# Patient Record
Sex: MALE | Race: OTHER | NOT HISPANIC OR LATINO | URBAN - METROPOLITAN AREA
[De-identification: names, ages, dates, MRNs, and addresses within clinical notes are randomized per-mention and may not be internally consistent; named-entity substitution may affect disease eponyms.]

---

## 2018-09-02 ENCOUNTER — EMERGENCY (EMERGENCY)
Facility: HOSPITAL | Age: 48
LOS: 1 days | Discharge: HOME | End: 2018-09-02
Attending: EMERGENCY MEDICINE | Admitting: EMERGENCY MEDICINE
Payer: COMMERCIAL

## 2018-09-02 VITALS
RESPIRATION RATE: 16 BRPM | DIASTOLIC BLOOD PRESSURE: 105 MMHG | SYSTOLIC BLOOD PRESSURE: 165 MMHG | OXYGEN SATURATION: 100 % | HEART RATE: 80 BPM | TEMPERATURE: 98 F

## 2018-09-02 LAB
ALBUMIN SERPL ELPH-MCNC: 4.3 G/DL — SIGNIFICANT CHANGE UP (ref 3.3–5)
ALP SERPL-CCNC: 65 U/L — SIGNIFICANT CHANGE UP (ref 40–120)
ALT FLD-CCNC: 18 U/L — SIGNIFICANT CHANGE UP (ref 4–41)
AST SERPL-CCNC: 19 U/L — SIGNIFICANT CHANGE UP (ref 4–40)
BASOPHILS # BLD AUTO: 0.07 K/UL — SIGNIFICANT CHANGE UP (ref 0–0.2)
BASOPHILS NFR BLD AUTO: 1.1 % — SIGNIFICANT CHANGE UP (ref 0–2)
BILIRUB SERPL-MCNC: 0.4 MG/DL — SIGNIFICANT CHANGE UP (ref 0.2–1.2)
BUN SERPL-MCNC: 15 MG/DL — SIGNIFICANT CHANGE UP (ref 7–23)
CALCIUM SERPL-MCNC: 8.9 MG/DL — SIGNIFICANT CHANGE UP (ref 8.4–10.5)
CHLORIDE SERPL-SCNC: 99 MMOL/L — SIGNIFICANT CHANGE UP (ref 98–107)
CO2 SERPL-SCNC: 22 MMOL/L — SIGNIFICANT CHANGE UP (ref 22–31)
CREAT SERPL-MCNC: 0.82 MG/DL — SIGNIFICANT CHANGE UP (ref 0.5–1.3)
EOSINOPHIL # BLD AUTO: 0.38 K/UL — SIGNIFICANT CHANGE UP (ref 0–0.5)
EOSINOPHIL NFR BLD AUTO: 5.7 % — SIGNIFICANT CHANGE UP (ref 0–6)
GLUCOSE SERPL-MCNC: 89 MG/DL — SIGNIFICANT CHANGE UP (ref 70–99)
HBA1C BLD-MCNC: 5.3 % — SIGNIFICANT CHANGE UP (ref 4–5.6)
HCT VFR BLD CALC: 44.7 % — SIGNIFICANT CHANGE UP (ref 39–50)
HGB BLD-MCNC: 14.9 G/DL — SIGNIFICANT CHANGE UP (ref 13–17)
IMM GRANULOCYTES # BLD AUTO: 0.02 # — SIGNIFICANT CHANGE UP
IMM GRANULOCYTES NFR BLD AUTO: 0.3 % — SIGNIFICANT CHANGE UP (ref 0–1.5)
LIDOCAIN IGE QN: 45.4 U/L — SIGNIFICANT CHANGE UP (ref 7–60)
LYMPHOCYTES # BLD AUTO: 2.2 K/UL — SIGNIFICANT CHANGE UP (ref 1–3.3)
LYMPHOCYTES # BLD AUTO: 33.1 % — SIGNIFICANT CHANGE UP (ref 13–44)
MCHC RBC-ENTMCNC: 30.2 PG — SIGNIFICANT CHANGE UP (ref 27–34)
MCHC RBC-ENTMCNC: 33.3 % — SIGNIFICANT CHANGE UP (ref 32–36)
MCV RBC AUTO: 90.5 FL — SIGNIFICANT CHANGE UP (ref 80–100)
MONOCYTES # BLD AUTO: 0.63 K/UL — SIGNIFICANT CHANGE UP (ref 0–0.9)
MONOCYTES NFR BLD AUTO: 9.5 % — SIGNIFICANT CHANGE UP (ref 2–14)
NEUTROPHILS # BLD AUTO: 3.35 K/UL — SIGNIFICANT CHANGE UP (ref 1.8–7.4)
NEUTROPHILS NFR BLD AUTO: 50.3 % — SIGNIFICANT CHANGE UP (ref 43–77)
NRBC # FLD: 0 — SIGNIFICANT CHANGE UP
PLATELET # BLD AUTO: 206 K/UL — SIGNIFICANT CHANGE UP (ref 150–400)
PMV BLD: 9.5 FL — SIGNIFICANT CHANGE UP (ref 7–13)
POTASSIUM SERPL-MCNC: 3.8 MMOL/L — SIGNIFICANT CHANGE UP (ref 3.5–5.3)
POTASSIUM SERPL-SCNC: 3.8 MMOL/L — SIGNIFICANT CHANGE UP (ref 3.5–5.3)
PROT SERPL-MCNC: 7.3 G/DL — SIGNIFICANT CHANGE UP (ref 6–8.3)
RBC # BLD: 4.94 M/UL — SIGNIFICANT CHANGE UP (ref 4.2–5.8)
RBC # FLD: 13.1 % — SIGNIFICANT CHANGE UP (ref 10.3–14.5)
SODIUM SERPL-SCNC: 137 MMOL/L — SIGNIFICANT CHANGE UP (ref 135–145)
TROPONIN T, HIGH SENSITIVITY: 7 NG/L — SIGNIFICANT CHANGE UP (ref ?–14)
TROPONIN T, HIGH SENSITIVITY: < 6 NG/L — SIGNIFICANT CHANGE UP (ref ?–14)
WBC # BLD: 6.65 K/UL — SIGNIFICANT CHANGE UP (ref 3.8–10.5)
WBC # FLD AUTO: 6.65 K/UL — SIGNIFICANT CHANGE UP (ref 3.8–10.5)

## 2018-09-02 PROCEDURE — 99220: CPT | Mod: 25

## 2018-09-02 PROCEDURE — 93010 ELECTROCARDIOGRAM REPORT: CPT | Mod: 59

## 2018-09-02 PROCEDURE — 71045 X-RAY EXAM CHEST 1 VIEW: CPT | Mod: 26

## 2018-09-02 RX ORDER — AMLODIPINE BESYLATE 2.5 MG/1
10 TABLET ORAL DAILY
Qty: 0 | Refills: 0 | Status: DISCONTINUED | OUTPATIENT
Start: 2018-09-02 | End: 2018-09-06

## 2018-09-02 RX ORDER — ASPIRIN/CALCIUM CARB/MAGNESIUM 324 MG
162 TABLET ORAL DAILY
Qty: 0 | Refills: 0 | Status: DISCONTINUED | OUTPATIENT
Start: 2018-09-02 | End: 2018-09-02

## 2018-09-02 RX ORDER — ASPIRIN/CALCIUM CARB/MAGNESIUM 324 MG
81 TABLET ORAL DAILY
Qty: 0 | Refills: 0 | Status: DISCONTINUED | OUTPATIENT
Start: 2018-09-03 | End: 2018-09-06

## 2018-09-02 RX ADMIN — AMLODIPINE BESYLATE 10 MILLIGRAM(S): 2.5 TABLET ORAL at 19:57

## 2018-09-02 RX ADMIN — Medication 162 MILLIGRAM(S): at 13:20

## 2018-09-02 RX ADMIN — Medication 100 MILLIGRAM(S): at 15:14

## 2018-09-02 NOTE — ED ADULT NURSE NOTE - CHIEF COMPLAINT QUOTE
Pt c/o chest tightness, dizziness and sob  since this am. L arm  and L sided pain/numbness.  Denies nausea, .  pt sent by [facility] center

## 2018-09-02 NOTE — ED PROVIDER NOTE - OBJECTIVE STATEMENT
49 yo M hx of HTN here for chest pain x a few days. pt reports over the past few days noted intermittent L sided chest discomfort, occassional SOB, last night noted discomfort became persistent to went to urgent care today and was sent to ED to r/o ACS. States pain is L sided, non radiating, nothing makes better/worse. Denies fhx of heart dz. Has not had stress or cards f/u. Denies fever chills vomiting HA dizziness weakness numbness tingling. 47 yo M hx of HTN here for chest pain x a few days. pt reports over the past few days noted intermittent L sided chest discomfort, occassional SOB, last night noted discomfort became persistent to went to urgent care today and was sent to ED to r/o ACS. States pain is L sided, non radiating, nothing makes better/worse. Denies fhx of heart dz. Has not had stress or cards f/u. Denies fever chills vomiting HA dizziness weakness numbness tingling.  Of note pt mentions drinking several days per week however denies withdrawral sxs or seizures in past. last drink was yesterday.

## 2018-09-02 NOTE — ED ADULT TRIAGE NOTE - CHIEF COMPLAINT QUOTE
Pt c/o chest tightness, dizziness and sob  since this am. L arm  and L sided pain/numbness.  Denies nausea, .  pt sent by [facility] center

## 2018-09-02 NOTE — ED CDU PROVIDER INITIAL DAY NOTE - ATTENDING CONTRIBUTION TO CARE
CDU MD DOUGHERTY:  I performed a face to face bedside interview with patient regarding history of present illness, review of symptoms and past medical history. I completed an independent physical exam.  I have discussed patient's plan of care with PA.   I agree with note as stated above, having amended the EMR as needed to reflect my findings. I have discussed the assessment and plan of care.  This includes during the time I functioned as the attending physician for this patient.    49 y/o male etoh abuse, htn with cp.  EKG tw abn v5, v6, no dynamic changes in ED.  Initial trop 7.  CDU for rpt trop and ekg, tele monitoring, stress in am.

## 2018-09-02 NOTE — ED PROVIDER NOTE - ATTENDING CONTRIBUTION TO CARE
I agree with the above H&P.  Briefly this is a 48 year old male presents with left sided cp that has been intermittent for a few weeks but today also developed left arm pain as well.  concern for acs vs msk pain vs pna vs ptx.  patient without prior cardiac testing. will check labs trop, cxr.  if all wnl will send to obs for stress test

## 2018-09-02 NOTE — ED ADULT NURSE NOTE - NSIMPLEMENTINTERV_GEN_ALL_ED
Implemented All Universal Safety Interventions:  [place] to call system. Call bell, personal items and telephone within reach. Instruct patient to call for assistance. Room bathroom lighting operational. Non-slip footwear when patient is off stretcher. Physically safe environment: no spills, clutter or unnecessary equipment. Stretcher in lowest position, wheels locked, appropriate side rails in place.

## 2018-09-02 NOTE — ED ADULT NURSE NOTE - OBJECTIVE STATEMENT
patient alert ox3 came in c/o left side pain radiating to left arm . h/o HTN, alcohol abuse. c/o on and off nausea. denies vomiting, connected to CM shows NSR. labs done as ordered. awaiting results and re eval.

## 2018-09-02 NOTE — ED CDU PROVIDER INITIAL DAY NOTE - OBJECTIVE STATEMENT
49 yo M hx of HTN here for chest pain x a few days. pt reports over the past few days noted intermittent L sided chest discomfort, occassional SOB, last night noted discomfort became persistent to went to urgent care today and was sent to ED to r/o ACS. States pain is L sided, non radiating, nothing makes better/worse. Denies fhx of heart dz. Has not had stress or cards f/u. Denies fever chills vomiting HA dizziness weakness numbness tingling.  Of note pt mentions drinking several days per week however denies withdrawral sxs or seizures in past. last drink was yesterday.    CDU ELIZABETH Cobos: Agree with above. Pt is a 49 y/o M hx HTN here w/ left sided chest pain w/ radiation into the left shoulder/arm x 2 days, occurs at rest, associated w/ shortness of breath. Never had a stress test in the past. Non smoker, no fam hx CAD. +daily drinker, drinks 2 shots a day. Sent to CDU for CEx2, telemetry, stress in the am. No complaints at present.

## 2018-09-03 VITALS
TEMPERATURE: 98 F | OXYGEN SATURATION: 99 % | DIASTOLIC BLOOD PRESSURE: 82 MMHG | RESPIRATION RATE: 17 BRPM | SYSTOLIC BLOOD PRESSURE: 134 MMHG | HEART RATE: 93 BPM

## 2018-09-03 PROCEDURE — 93018 CV STRESS TEST I&R ONLY: CPT | Mod: GC

## 2018-09-03 PROCEDURE — 78452 HT MUSCLE IMAGE SPECT MULT: CPT | Mod: 26

## 2018-09-03 PROCEDURE — 93016 CV STRESS TEST SUPVJ ONLY: CPT | Mod: GC

## 2018-09-03 PROCEDURE — 99217: CPT

## 2018-09-03 RX ADMIN — Medication 81 MILLIGRAM(S): at 12:48

## 2018-09-03 RX ADMIN — AMLODIPINE BESYLATE 10 MILLIGRAM(S): 2.5 TABLET ORAL at 06:14

## 2018-09-03 NOTE — ED CDU PROVIDER SUBSEQUENT DAY NOTE - MEDICAL DECISION MAKING DETAILS
47 yo M hx of HTN here for chest pain x a few days. pt reports over the past few days noted intermittent L sided chest discomfort, occasional SOB. Patient has been asymptomatic while in CDU, plan is an AM stress test, no other acute symptoms or changes.

## 2018-09-03 NOTE — ED CDU PROVIDER SUBSEQUENT DAY NOTE - HISTORY
49 yo M hx of HTN here for chest pain x a few days. pt reports over the past few days noted intermittent L sided chest discomfort, occassional SOB, last night noted discomfort became persistent to went to urgent care today and was sent to ED to r/o ACS. States pain is L sided, non radiating, nothing makes better/worse. Denies fhx of heart dz. Has not had stress or cards f/u. Denies fever chills vomiting HA dizziness weakness numbness tingling.  Of note pt mentions drinking several days per week however denies withdrawral sxs or seizures in past. last drink was yesterday.  CDU ELIZABETH Diaz: Agree with above, 49 yo M hx of HTN here for chest pain x a few days. pt reports over the past few days noted intermittent L sided chest discomfort, occasional SOB, last night noted discomfort became persistent to went to urgent care today and was sent to ED to r/o ACS. Patient drinks 2 shots daily, was given librium by attending Cho. Patient to CDU for a stress test to evaluate CP, pt has never had a stress test before. Patient has been asymptomatic while in CDU.

## 2018-09-03 NOTE — ED ADULT NURSE REASSESSMENT NOTE - NS ED NURSE REASSESS COMMENT FT1
pt resting, sleeping long intervals, easily arousable. offers no complaints at this time. cardiac monitor in place in nsr. safety maintained. ivl site clean, dry, intact. vs as noted. will reassess.
received report on pt from DESTINEE Hercules at 2030. pt presents awake a&ox4, offers no complaints at this time. denies dizziness, ha. skin warm, dry, appropriate for race. respirations even, unlabored. denies cp or sob at present. abdomen soft nontender nondistended. denies n/v/d. denies fevers or chills. ivl site clean, dry, intact, patent. cardiac monitor in place in nsr. family at bedside. vs as noted. safety maintained. will reassess.

## 2018-09-03 NOTE — ED CDU PROVIDER DISPOSITION NOTE - CLINICAL COURSE
48M with c/o L sided CP, episodic x 3d, no assoc. with exertion, + one episode with SOB, no N/V/diaphoresis, became more constant and came to ED. Initial EKG and troponin normal, sent to ED for cardiac monitor and poss. stress testing. Pt. with hx of regular EtOH use but denied tremor, anxiety, weakness, HA, palpitations. In CDU CP resolved and was asymptomatic in a.m., no sx during stress test which was reported as normal. Pt. to FU with PMD, cont. usual BP med and offered ASA 81mg until FU as outpt. Given copies of testing and DC'd home with family.

## 2019-06-27 ENCOUNTER — EMERGENCY (EMERGENCY)
Facility: HOSPITAL | Age: 49
LOS: 1 days | Discharge: HOME | End: 2019-06-27
Attending: EMERGENCY MEDICINE | Admitting: EMERGENCY MEDICINE
Payer: COMMERCIAL

## 2019-06-27 VITALS
OXYGEN SATURATION: 100 % | TEMPERATURE: 98 F | RESPIRATION RATE: 16 BRPM | HEART RATE: 96 BPM | SYSTOLIC BLOOD PRESSURE: 141 MMHG | DIASTOLIC BLOOD PRESSURE: 94 MMHG

## 2019-06-27 PROCEDURE — 73630 X-RAY EXAM OF FOOT: CPT | Mod: 26,LT

## 2019-06-27 PROCEDURE — 73590 X-RAY EXAM OF LOWER LEG: CPT | Mod: 26,LT

## 2019-06-27 PROCEDURE — 73610 X-RAY EXAM OF ANKLE: CPT | Mod: 26,LT

## 2019-06-27 PROCEDURE — 99283 EMERGENCY DEPT VISIT LOW MDM: CPT | Mod: 25

## 2019-06-27 RX ORDER — IBUPROFEN 200 MG
600 TABLET ORAL ONCE
Refills: 0 | Status: COMPLETED | OUTPATIENT
Start: 2019-06-27 | End: 2019-06-27

## 2019-06-27 RX ADMIN — Medication 600 MILLIGRAM(S): at 11:10

## 2019-06-27 RX ADMIN — Medication 600 MILLIGRAM(S): at 09:49

## 2019-06-27 NOTE — ED PROVIDER NOTE - NSFOLLOWUPINSTRUCTIONS_ED_ALL_ED_FT
1) Please follow-up with your primary care doctor in the next 2-3 days.  Please call tomorrow for an appointment.  If you cannot follow-up with your primary care doctor please return to the ED for any urgent issues.  2) You were given a copy of the tests performed today.  Please bring the results with you and review them with your primary care doctor.  3) If you have any worsening of symptoms or any other concerns please return to the ED immediately. Such as but not limited to increased uncontrolled pain, fever, weakness, numbness   4) Please continue taking your home medications as directed. Please take Tylenol (Acetaminophen) 650 mg every 4-6 hours as needed for pain. Please do not exceed more than 4,000mg of Tylenol in a day Please take Motrin (Ibuprofen) 600mg by mouth every 6 hours as needed for pain. Please take this medication with food. 1. Keep your leg elevated as much as possible.  Apply ice 20 minutes on then 20 minutes off as much as you can tolerate in the next 24 hours.

## 2019-06-27 NOTE — ED PROVIDER NOTE - OBJECTIVE STATEMENT
Ky Nunez MD: 50 yo M with PMH of HTN and gout p/w left foot pain and swelling x 2 days. Aggravated by walking, standing and movement of the foot, relieved temporary by advil. Pt state that the pain rads up his leg to his knee when her moves his foot. Pt states that he started a new job which he stands a lot and report having new shows. Report never having this pain before. Denies any trauma, or injury to the foot. Denies fever, weakness, or numbness. Ky Nunez MD: 50 yo M with PMH of HTN and gout p/w left foot pain and swelling x 2 days. Aggravated by walking, standing and movement of the foot, relieved temporary by advil. Pt state that the pain rads up his leg to his knee when her moves his foot. Pt states that he started a new job which he stands a lot and report having new shoes.  Reports never having this pain before. Denies any trauma, or injury to the foot. Denies fever, weakness, or numbness.

## 2019-06-27 NOTE — ED PROVIDER NOTE - CLINICAL SUMMARY MEDICAL DECISION MAKING FREE TEXT BOX
Ky Nunez MD: 48 yo M with PMH of HTN and gout p/w left foot pain and swelling x 2 days. Gout vs tendinitis will r/o fx foot xray motrin

## 2019-06-27 NOTE — ED ADULT TRIAGE NOTE - CHIEF COMPLAINT QUOTE
Pt st' I have pain on top of foot, started 2 days ago getting worse can't even walk today." Pt denies trauma. Denies Med hx. + reddness + swelling of rt foot and ankle

## 2019-06-27 NOTE — ED PROVIDER NOTE - ATTENDING CONTRIBUTION TO CARE
Dr. Samuel: 50 yo male with HTN, gout in the past, in ED with 2 days of atraumatic left foot pain.  Pt recently started a new job where he stands a lot, and he has new shoes, states that pain is mostly in left foot and radiates up toward left shin but not left calf.  No fever, CP/SOB, N/V/D or abdominal pain.  Left foot with generalized swelling to forefoot and ecchmosis and TTP over 1st toe and along medial plantar surface of foot.  Strong DP pulse, no erythema, no warmth.  TTP extends to distal shin but calf is normal appearing, no swelling, no TTP.  Right LE exam normal.  Strength 5/5 in all 4 extremities.

## 2019-06-27 NOTE — ED PROVIDER NOTE - PHYSICAL EXAMINATION
MSK: Left foot TTP and swelling of the medial lateral area, full range of motion of all ext, TTP left leg laterally. MSK: Left foot TTP and swelling of the medial and lateral forefoot, full range of motion of all ext, strong DP pulse, no warmth or erythema

## 2019-06-27 NOTE — ED PROVIDER NOTE - NS ED ROS FT
GENERAL: No fever or chills, EYES: no change in vision, HEENT: no trouble speaking, CARDIAC: no chest pain, palpitation PULMONARY: no cough or SOB, GI: no abdominal pain, no nausea, no vomiting, no diarrhea or constipation, : No changes in urination, SKIN: no rashes, NEURO: no headache,  MSK: + foot pain ~Ky Nunez MD (PGY 1)

## 2019-06-27 NOTE — SBIRT NOTE ADULT - NSSBIRTBRIEFINTDET_GEN_A_CORE
Provided SBIRT services: Full screen positive. Brief Intervention Performed. Screening results were reviewed with the patient and patient was provided information about healthy guidelines and potential negative consequences associated with level of risk. Motivation and readiness to reduce or stop use was discussed and goals and activities to make changes were suggested/offered.

## 2020-11-25 NOTE — ED CDU PROVIDER SUBSEQUENT DAY NOTE - GASTROINTESTINAL NEGATIVE STATEMENT, MLM
Patient's coronary calcium score test is elevated.  I would like patient to follow-up either with a video visit or in person to discuss in more detail. no abd pn n v d dizz

## 2023-04-16 ENCOUNTER — INPATIENT (INPATIENT)
Facility: HOSPITAL | Age: 53
LOS: 1 days | Discharge: HOME | End: 2023-04-18
Attending: HOSPITALIST | Admitting: HOSPITALIST
Payer: COMMERCIAL

## 2023-04-16 VITALS
OXYGEN SATURATION: 100 % | HEART RATE: 85 BPM | SYSTOLIC BLOOD PRESSURE: 159 MMHG | TEMPERATURE: 98 F | DIASTOLIC BLOOD PRESSURE: 100 MMHG | RESPIRATION RATE: 18 BRPM

## 2023-04-16 DIAGNOSIS — F10.239 ALCOHOL DEPENDENCE WITH WITHDRAWAL, UNSPECIFIED: ICD-10-CM

## 2023-04-16 DIAGNOSIS — I10 ESSENTIAL (PRIMARY) HYPERTENSION: ICD-10-CM

## 2023-04-16 DIAGNOSIS — G62.9 POLYNEUROPATHY, UNSPECIFIED: ICD-10-CM

## 2023-04-16 DIAGNOSIS — K70.10 ALCOHOLIC HEPATITIS WITHOUT ASCITES: ICD-10-CM

## 2023-04-16 DIAGNOSIS — Z78.9 OTHER SPECIFIED HEALTH STATUS: ICD-10-CM

## 2023-04-16 DIAGNOSIS — Z29.9 ENCOUNTER FOR PROPHYLACTIC MEASURES, UNSPECIFIED: ICD-10-CM

## 2023-04-16 DIAGNOSIS — R51.9 HEADACHE, UNSPECIFIED: ICD-10-CM

## 2023-04-16 DIAGNOSIS — E11.9 TYPE 2 DIABETES MELLITUS WITHOUT COMPLICATIONS: ICD-10-CM

## 2023-04-16 LAB
A1C WITH ESTIMATED AVERAGE GLUCOSE RESULT: 6.2 % — HIGH (ref 4–5.6)
ALBUMIN SERPL ELPH-MCNC: 3.2 G/DL — LOW (ref 3.3–5)
ALP SERPL-CCNC: 313 U/L — HIGH (ref 40–120)
ALT FLD-CCNC: 100 U/L — HIGH (ref 4–41)
AMMONIA BLD-MCNC: 64 UMOL/L — HIGH (ref 11–55)
ANION GAP SERPL CALC-SCNC: 10 MMOL/L — SIGNIFICANT CHANGE UP (ref 7–14)
APTT BLD: 35.6 SEC — SIGNIFICANT CHANGE UP (ref 27–36.3)
AST SERPL-CCNC: 86 U/L — HIGH (ref 4–40)
BASE EXCESS BLDV CALC-SCNC: 3.9 MMOL/L — HIGH (ref -2–3)
BASOPHILS # BLD AUTO: 0.03 K/UL — SIGNIFICANT CHANGE UP (ref 0–0.2)
BASOPHILS NFR BLD AUTO: 0.4 % — SIGNIFICANT CHANGE UP (ref 0–2)
BILIRUB SERPL-MCNC: 5.1 MG/DL — HIGH (ref 0.2–1.2)
BUN SERPL-MCNC: 10 MG/DL — SIGNIFICANT CHANGE UP (ref 7–23)
CALCIUM SERPL-MCNC: 8.7 MG/DL — SIGNIFICANT CHANGE UP (ref 8.4–10.5)
CHLORIDE SERPL-SCNC: 96 MMOL/L — LOW (ref 98–107)
CO2 BLDV-SCNC: 31.8 MMOL/L — HIGH (ref 22–26)
CO2 SERPL-SCNC: 25 MMOL/L — SIGNIFICANT CHANGE UP (ref 22–31)
CREAT SERPL-MCNC: 0.66 MG/DL — SIGNIFICANT CHANGE UP (ref 0.5–1.3)
EGFR: 114 ML/MIN/1.73M2 — SIGNIFICANT CHANGE UP
EOSINOPHIL # BLD AUTO: 0.01 K/UL — SIGNIFICANT CHANGE UP (ref 0–0.5)
EOSINOPHIL NFR BLD AUTO: 0.1 % — SIGNIFICANT CHANGE UP (ref 0–6)
ESTIMATED AVERAGE GLUCOSE: 131 — SIGNIFICANT CHANGE UP
ETHANOL SERPL-MCNC: <10 MG/DL — SIGNIFICANT CHANGE UP
FLUAV AG NPH QL: SIGNIFICANT CHANGE UP
FLUBV AG NPH QL: SIGNIFICANT CHANGE UP
GLUCOSE BLDC GLUCOMTR-MCNC: 124 MG/DL — HIGH (ref 70–99)
GLUCOSE BLDC GLUCOMTR-MCNC: 272 MG/DL — HIGH (ref 70–99)
GLUCOSE BLDC GLUCOMTR-MCNC: 99 MG/DL — SIGNIFICANT CHANGE UP (ref 70–99)
GLUCOSE SERPL-MCNC: 331 MG/DL — HIGH (ref 70–99)
HCO3 BLDV-SCNC: 30 MMOL/L — HIGH (ref 22–29)
HCT VFR BLD CALC: 38.8 % — LOW (ref 39–50)
HGB BLD-MCNC: 12.6 G/DL — LOW (ref 13–17)
IANC: 4.7 K/UL — SIGNIFICANT CHANGE UP (ref 1.8–7.4)
IMM GRANULOCYTES NFR BLD AUTO: 5.2 % — HIGH (ref 0–0.9)
INR BLD: 1.22 RATIO — HIGH (ref 0.88–1.16)
LIDOCAIN IGE QN: 236 U/L — HIGH (ref 7–60)
LYMPHOCYTES # BLD AUTO: 1.92 K/UL — SIGNIFICANT CHANGE UP (ref 1–3.3)
LYMPHOCYTES # BLD AUTO: 24.7 % — SIGNIFICANT CHANGE UP (ref 13–44)
MCHC RBC-ENTMCNC: 31.3 PG — SIGNIFICANT CHANGE UP (ref 27–34)
MCHC RBC-ENTMCNC: 32.5 GM/DL — SIGNIFICANT CHANGE UP (ref 32–36)
MCV RBC AUTO: 96.5 FL — SIGNIFICANT CHANGE UP (ref 80–100)
MONOCYTES # BLD AUTO: 0.7 K/UL — SIGNIFICANT CHANGE UP (ref 0–0.9)
MONOCYTES NFR BLD AUTO: 9 % — SIGNIFICANT CHANGE UP (ref 2–14)
NEUTROPHILS # BLD AUTO: 4.7 K/UL — SIGNIFICANT CHANGE UP (ref 1.8–7.4)
NEUTROPHILS NFR BLD AUTO: 60.6 % — SIGNIFICANT CHANGE UP (ref 43–77)
NRBC # BLD: 2 /100 WBCS — HIGH (ref 0–0)
NRBC # FLD: 0.12 K/UL — HIGH (ref 0–0)
PCO2 BLDV: 51 MMHG — SIGNIFICANT CHANGE UP (ref 42–55)
PH BLDV: 7.38 — SIGNIFICANT CHANGE UP (ref 7.32–7.43)
PLATELET # BLD AUTO: 240 K/UL — SIGNIFICANT CHANGE UP (ref 150–400)
PO2 BLDV: 28 MMHG — SIGNIFICANT CHANGE UP (ref 25–45)
POTASSIUM SERPL-MCNC: 3.5 MMOL/L — SIGNIFICANT CHANGE UP (ref 3.5–5.3)
POTASSIUM SERPL-SCNC: 3.5 MMOL/L — SIGNIFICANT CHANGE UP (ref 3.5–5.3)
PROT SERPL-MCNC: 6.6 G/DL — SIGNIFICANT CHANGE UP (ref 6–8.3)
PROTHROM AB SERPL-ACNC: 14.2 SEC — HIGH (ref 10.5–13.4)
RBC # BLD: 4.02 M/UL — LOW (ref 4.2–5.8)
RBC # FLD: 18.2 % — HIGH (ref 10.3–14.5)
RSV RNA NPH QL NAA+NON-PROBE: SIGNIFICANT CHANGE UP
SAO2 % BLDV: 35.5 % — LOW (ref 67–88)
SARS-COV-2 RNA SPEC QL NAA+PROBE: SIGNIFICANT CHANGE UP
SODIUM SERPL-SCNC: 131 MMOL/L — LOW (ref 135–145)
TROPONIN T, HIGH SENSITIVITY RESULT: 10 NG/L — SIGNIFICANT CHANGE UP
WBC # BLD: 7.76 K/UL — SIGNIFICANT CHANGE UP (ref 3.8–10.5)
WBC # FLD AUTO: 7.76 K/UL — SIGNIFICANT CHANGE UP (ref 3.8–10.5)

## 2023-04-16 PROCEDURE — 99285 EMERGENCY DEPT VISIT HI MDM: CPT

## 2023-04-16 PROCEDURE — 99222 1ST HOSP IP/OBS MODERATE 55: CPT

## 2023-04-16 PROCEDURE — 70496 CT ANGIOGRAPHY HEAD: CPT | Mod: 26,MA

## 2023-04-16 PROCEDURE — 70498 CT ANGIOGRAPHY NECK: CPT | Mod: 26,MA

## 2023-04-16 PROCEDURE — 99223 1ST HOSP IP/OBS HIGH 75: CPT | Mod: GC

## 2023-04-16 RX ORDER — KETOROLAC TROMETHAMINE 30 MG/ML
15 SYRINGE (ML) INJECTION ONCE
Refills: 0 | Status: DISCONTINUED | OUTPATIENT
Start: 2023-04-16 | End: 2023-04-16

## 2023-04-16 RX ORDER — PREDNISOLONE 5 MG
40 TABLET ORAL DAILY
Refills: 0 | Status: DISCONTINUED | OUTPATIENT
Start: 2023-04-16 | End: 2023-04-18

## 2023-04-16 RX ORDER — LISINOPRIL 2.5 MG/1
10 TABLET ORAL DAILY
Refills: 0 | Status: DISCONTINUED | OUTPATIENT
Start: 2023-04-16 | End: 2023-04-18

## 2023-04-16 RX ORDER — SODIUM CHLORIDE 9 MG/ML
1000 INJECTION, SOLUTION INTRAVENOUS
Refills: 0 | Status: DISCONTINUED | OUTPATIENT
Start: 2023-04-16 | End: 2023-04-16

## 2023-04-16 RX ORDER — LISINOPRIL 2.5 MG/1
1 TABLET ORAL
Refills: 0 | DISCHARGE

## 2023-04-16 RX ORDER — INSULIN LISPRO 100/ML
VIAL (ML) SUBCUTANEOUS AT BEDTIME
Refills: 0 | Status: DISCONTINUED | OUTPATIENT
Start: 2023-04-16 | End: 2023-04-18

## 2023-04-16 RX ORDER — DEXTROSE 50 % IN WATER 50 %
12.5 SYRINGE (ML) INTRAVENOUS ONCE
Refills: 0 | Status: DISCONTINUED | OUTPATIENT
Start: 2023-04-16 | End: 2023-04-18

## 2023-04-16 RX ORDER — METFORMIN HYDROCHLORIDE 850 MG/1
1 TABLET ORAL
Refills: 0 | DISCHARGE

## 2023-04-16 RX ORDER — SODIUM CHLORIDE 9 MG/ML
1000 INJECTION INTRAMUSCULAR; INTRAVENOUS; SUBCUTANEOUS ONCE
Refills: 0 | Status: COMPLETED | OUTPATIENT
Start: 2023-04-16 | End: 2023-04-16

## 2023-04-16 RX ORDER — GLUCAGON INJECTION, SOLUTION 0.5 MG/.1ML
1 INJECTION, SOLUTION SUBCUTANEOUS ONCE
Refills: 0 | Status: DISCONTINUED | OUTPATIENT
Start: 2023-04-16 | End: 2023-04-16

## 2023-04-16 RX ORDER — DEXTROSE 50 % IN WATER 50 %
25 SYRINGE (ML) INTRAVENOUS ONCE
Refills: 0 | Status: DISCONTINUED | OUTPATIENT
Start: 2023-04-16 | End: 2023-04-18

## 2023-04-16 RX ORDER — AMLODIPINE BESYLATE 2.5 MG/1
5 TABLET ORAL DAILY
Refills: 0 | Status: DISCONTINUED | OUTPATIENT
Start: 2023-04-16 | End: 2023-04-18

## 2023-04-16 RX ORDER — AMLODIPINE BESYLATE 2.5 MG/1
1 TABLET ORAL
Refills: 0 | DISCHARGE

## 2023-04-16 RX ORDER — METOCLOPRAMIDE HCL 10 MG
10 TABLET ORAL ONCE
Refills: 0 | Status: COMPLETED | OUTPATIENT
Start: 2023-04-16 | End: 2023-04-16

## 2023-04-16 RX ORDER — DEXTROSE 50 % IN WATER 50 %
15 SYRINGE (ML) INTRAVENOUS ONCE
Refills: 0 | Status: DISCONTINUED | OUTPATIENT
Start: 2023-04-16 | End: 2023-04-18

## 2023-04-16 RX ORDER — FOLIC ACID 0.8 MG
1 TABLET ORAL DAILY
Refills: 0 | Status: DISCONTINUED | OUTPATIENT
Start: 2023-04-16 | End: 2023-04-18

## 2023-04-16 RX ORDER — THIAMINE MONONITRATE (VIT B1) 100 MG
500 TABLET ORAL DAILY
Refills: 0 | Status: DISCONTINUED | OUTPATIENT
Start: 2023-04-16 | End: 2023-04-18

## 2023-04-16 RX ORDER — INSULIN LISPRO 100/ML
VIAL (ML) SUBCUTANEOUS
Refills: 0 | Status: DISCONTINUED | OUTPATIENT
Start: 2023-04-16 | End: 2023-04-18

## 2023-04-16 RX ADMIN — Medication 40 MILLIGRAM(S): at 17:31

## 2023-04-16 RX ADMIN — AMLODIPINE BESYLATE 5 MILLIGRAM(S): 2.5 TABLET ORAL at 13:11

## 2023-04-16 RX ADMIN — Medication 105 MILLIGRAM(S): at 15:51

## 2023-04-16 RX ADMIN — Medication 1 MILLIGRAM(S): at 13:11

## 2023-04-16 RX ADMIN — Medication 15 MILLIGRAM(S): at 08:55

## 2023-04-16 RX ADMIN — Medication 1 TABLET(S): at 13:11

## 2023-04-16 RX ADMIN — Medication 15 MILLIGRAM(S): at 09:14

## 2023-04-16 RX ADMIN — Medication 1: at 22:26

## 2023-04-16 RX ADMIN — Medication 10 MILLIGRAM(S): at 09:00

## 2023-04-16 RX ADMIN — SODIUM CHLORIDE 1000 MILLILITER(S): 9 INJECTION INTRAMUSCULAR; INTRAVENOUS; SUBCUTANEOUS at 09:00

## 2023-04-16 RX ADMIN — LISINOPRIL 10 MILLIGRAM(S): 2.5 TABLET ORAL at 13:11

## 2023-04-16 NOTE — ED ADULT NURSE REASSESSMENT NOTE - NS ED NURSE REASSESS COMMENT FT1
report given to floor/ pt to be transferred
pt passed dysphagia screening, pt still remains  with slight numbness in left lower leg. pt medicated for headache with some relief  ciwa 2. remains on cm/  offers no complaints

## 2023-04-16 NOTE — H&P ADULT - NSHPLABSRESULTS_GEN_ALL_CORE
.  LABS:                         12.6   7.76  )-----------( 240      ( 16 Apr 2023 07:40 )             38.8     04-16    131<L>  |  96<L>  |  10  ----------------------------<  331<H>  3.5   |  25  |  0.66    Ca    8.7      16 Apr 2023 07:40    TPro  6.6  /  Alb  3.2<L>  /  TBili  5.1<H>  /  DBili  x   /  AST  86<H>  /  ALT  100<H>  /  AlkPhos  313<H>  04-16    PT/INR - ( 16 Apr 2023 07:40 )   PT: 14.2 sec;   INR: 1.22 ratio         PTT - ( 16 Apr 2023 07:40 )  PTT:35.6 sec          RADIOLOGY, EKG & ADDITIONAL TESTS:    CTH:  < from: CT Head No Cont (04.16.23 @ 08:06) >    IMPRESSION:  No acute intracranial hemorrhage, mass effect, or midline shift.    Please see separately dictated report for CTA brain/neck findings.    Findings were discussed with neurology provider Jayne by Dr. Antony on   4/16/2023 7:57 AM with read back confirmation.    < end of copied text >    CTA brain/neck:  < from: CT Angio Neck Stroke Protocol w/ IV Cont (04.16.23 @ 08:04) >      IMPRESSION:    CTA BRAIN: Patent anterior and posterior circulations without   flow-limiting stenosis or vascular occlusion.    CTA NECK: Patent anterior and posterior circulations without significant   ICA stenosis as per NASCET criteria.    < end of copied text >

## 2023-04-16 NOTE — H&P ADULT - PROBLEM SELECTOR PLAN 3
Recently admitted for scleral icterus at [facility] (discharged 4/14/23) diagnosed with alcoholic hepatitis and discharged on prednisolone after GI consult. Discharge paperwork in patient's paper chart.  - 4/12 Tbili 8.1  - 4/12 CT A/P with nonspecific RUQ stranding  - 4/12 RUQ U/S with hepatomegaly and fatty infiltrate, sludge in gallbladder with mild thickening  - 4/12 HIDA negative  - OSH hepatitis panel and ceruloplasmin negative  - Was on prednisolone taper prior to admission  - Consider hepatology consult for recommendations Recently admitted for scleral icterus at [facility] (discharged 4/14/23) diagnosed with alcoholic hepatitis and discharged on prednisolone after GI consult. Discharge paperwork in patient's paper chart.   - 4/12 Tbili 8.1  - 4/12 CT A/P with nonspecific RUQ stranding  - 4/12 RUQ U/S with hepatomegaly and fatty infiltrate, sludge in gallbladder with mild thickening  - 4/12 HIDA negative  - OSH hepatitis panel and ceruloplasmin negative, MDF at OSH 17.3  - Was discharged on prednisolone taper  - MDF this admission 14  - Consider hepatology consult for recommendations Recently admitted for scleral icterus at [facility] (discharged 4/14/23) diagnosed with alcoholic hepatitis and discharged on prednisolone after GI consult. Discharge paperwork in patient's paper chart.   - 4/12 Tbili 8.1  - 4/12 CT A/P with nonspecific RUQ stranding  - 4/12 RUQ U/S with hepatomegaly and fatty infiltrate, sludge in gallbladder with mild thickening  - 4/12 HIDA negative  - OSH hepatitis panel and ceruloplasmin negative, MDF at OSH 17  - Was discharged on prednisolone taper  - MDF this admission 14  - appreciate hepatology recommendations Recently admitted for scleral icterus at [facility] (discharged 4/14/23) diagnosed with alcoholic hepatitis and discharged on prednisolone after GI consult. Discharge paperwork in patient's paper chart.   - 4/12 CT A/P with nonspecific RUQ stranding  - 4/12 RUQ U/S with hepatomegaly and fatty infiltrate, sludge in gallbladder with mild thickening  - 4/12 HIDA negative  - OSH hepatitis panel and ceruloplasmin negative  - 4/11 PT 16, Bili 10.8, MDF 33, started on prednisolone and discharged on taper  - c/w prednisolone 40mg daily, calculate Lille score in AM (Day 7)  - hepatology consult

## 2023-04-16 NOTE — ED PROVIDER NOTE - PROGRESS NOTE DETAILS
Alfonso Carreno MD, PGY-1: No large vessel occlusion identified on head CT imaging.  Patient labs show transaminitis with elevated lipase and near normal ammonia level.  At this time, patient likely in mild withdrawal with no concern for hepatic encephalopathy.  Patient to be admitted for further testing and treatment.

## 2023-04-16 NOTE — H&P ADULT - PROBLEM SELECTOR PLAN 5
On home amlodipine 5 daily, losartan 10 daily.   - c/w home medications On home metformin, recently started. Admission glucose in 300s.  - f/u A1C  - start LDSSI  - basal-bolus, titrate to glucose 110 - 180  - carb consistent diet

## 2023-04-16 NOTE — ED PROVIDER NOTE - ATTENDING CONTRIBUTION TO CARE
GEN - NAD; well appearing; A+O x3   HEAD - NC/AT   EYES- PERRL, EOMI, +scleral icterus  ENT: Airway patent, mmm, +tongue fasciculations, Oral cavity and pharynx normal. No inflammation, swelling, exudate, or lesions.  NECK: Neck supple  PULMONARY - CTA b/l, symmetric breath sounds.   CARDIAC -s1s2, RRR, no M,G,R  ABDOMEN - +BS, ND, NT, soft, no guarding, no rebound, no masses   BACK - no CVA tenderness, Normal  spine   EXTREMITIES - FROM, symmetric pulses, capillary refill < 2 seconds, no edema   SKIN - no rash or bruising   NEUROLOGIC - alert, speech clear, 5/5 strength b/l ue/le, cn2-12 int, intermittent patches of reported sensory diminishment to lue  and lle, gait steady, f-n nl, +mildly tremulous  PSYCH -calm, cooperative, linear, answers appropriate  Agree with history as noted above, patient is a 52-year-old male with history of (?)  Hepatitis, NIDDM, just DC from hospital 2 days ago in process of work-up for hepatic neck malignancy though has not had any definitive imaging as yet, presenting to ED with left-sided numbness and headache that started at 12 AM today.  Reports last known normal was 9 PM last night.  Woke up at 12 AM and noticed left-sided headache which is been constant since then and felt that the numbness to his left side of his body is worse than normal.  Patient reports he always has intermittent numbness to the left upper extremity and lower extremity which she has been experiencing for what he reports is a long time, stating he leans on his left side as a  in his car and he noticed it for years.  Reports he feels it was worse since 12 AM today.  He also notes he has not had any motor weakness, vision changes, dizziness, neck pain or stiffness, fevers, chills, chest pain, shortness of breath, swelling, vomiting.  Does report some photophobia.  Has not trialed anything yet for the symptoms.  On exam his neurologic exam is grossly normal outside of intermittent scattered patches of reported sensory diminishment to the left side of his body with remaining parts normal.  Code stroke was called at triage, neurology was immediately at patient's side with ED team, decision made to perform CT/CTA as well as labs to further evaluate for differential including but not limited to, CVA, complex migraine, hepatic pathology, electrolyte disturbances, organ dysfunction, will symptomatically treat, likely admit pending initial work-up.

## 2023-04-16 NOTE — H&P ADULT - NSHPREVIEWOFSYSTEMS_GEN_ALL_CORE
General: Denies dizziness, fatigue  Eyes: Denies blurry vision  ENMT: Denies rhinorrhea  Respiratory: Denies cough, SOB  Cardiovascular: Denies palpitations, CP  Gastrointestinal: Denies abd pain, N/V/D/C, hematochezia, melena  : Denies dysuria, increased freq  Musculoskeletal: Denies edema, joint pain  Endocrine: Denies increased thirst, increased frequency  Allergic/Immunologic: Denies rashes or hives  Neuro: + NUMBNESS, see HPI  Psych: Denies anxiety, depression  All ROS negative unless indicated above

## 2023-04-16 NOTE — ED PROVIDER NOTE - CARE PLAN
Principal Discharge DX:	Alcohol dependence with withdrawal  Secondary Diagnosis:	Transaminitis  Secondary Diagnosis:	Elevated lipase  Secondary Diagnosis:	Acute headache   1

## 2023-04-16 NOTE — H&P ADULT - PROBLEM SELECTOR PLAN 1
Presenting with left sided 10/10 headache on 4/16 associated with acute on chronic peripheral neuropathy. No prior episodes.   - Code stroke called on 4/16  - CTH and CTA brain/neck negative  - resolved with toradol  - CTM  - Appreciate neurology recs

## 2023-04-16 NOTE — H&P ADULT - PROBLEM SELECTOR PLAN 2
Chronic neuropathy involving left lateral distal arm, 5th finger, left lateral distal shin, 5th toe.   - Suspect ulnar and superficial peroneal distribution  - Most likely ddx includes work-related compression (), alcohol peripheral neuropathy  - Currently at baseline  - CTM

## 2023-04-16 NOTE — ED PROVIDER NOTE - CLINICAL SUMMARY MEDICAL DECISION MAKING FREE TEXT BOX
52-year-old male with past medical history of hepatitis, non-insulin-dependent diabetes, recent hospital discharge with work-up for possible hepatic malignancy, presents to the emergency department complaining of left-sided numbness upon awakening from sleep at 12 AM today.  He has a baseline history of left distal upper extremity numbness which he attributes to pressure on his elbow while driving as a .  He also reports a left-sided headache which he states has not been present in the past.  He denies any motor weakness bilaterally.  He further denies any vision changes, disequilibrium, nausea, vomiting.  Since onset of symptoms, he has not taken any medication.    ROS: All other systems negative except as per HPI    General: well appearing, alert, oriented to person, time, place  Psych: mood appropriate  Head: normocephalic; atraumatic  Eyes: Icteric sclera  ENT: no nasal flaring, patent nares  Cardio: Regular rate and rhythm; normal heart sounds  Resp: Clear to auscultation bilaterally  GI: Abdomen soft, nontender, nondistended  : No CVA tenderness  Neuro: Strength 5/5 in bilateral upper and lower extremities; cranial nerves II through XII intact; patchy left-sided sensory deficits in a nondermatomal pattern; normal ambulation; mild tongue fasciculations; mild tremor in bilateral hands  Skin: No rashes or bruising noted  MSK: Normal movement of extremities  Lymph/Vasc: No LE edema    MDM: 52-year-old male with recent hospitalization for hepatitis here for atypical left-sided sensory deficits upon awakening at 12 AM.  Last known well was within 24 hours.  Patient to be activated as a stroke code.  At this time, differential diagnosis includes but is not limited to the following: Ischemic CVA, hepatic encephalopathy, peripheral neuropathy, atypical migraine, alcohol withdrawal.  Patient will receive full set of labs including ammonia level. [facility] protocol in place. Neurology following the case, recommending treatment for headache with reassessment of symptoms.  Patient disposition likely admission.

## 2023-04-16 NOTE — CONSULT NOTE ADULT - SUBJECTIVE AND OBJECTIVE BOX
HPI:    Mr. Rey is a 52 yrs old male w/ hx of HTN, DM2 (metformin), alcohol use disorder, recent admission for alcoholic hepatitis ([facility] 4/11 - 4/14) presenting with left sided headache and acute on chronic left sided neuropathy. In ED, code stroke was called. CTH and CTA brain/neck was negative. After one dose of toradol patient's headache resolved, and numbness along the lateral distal arm/fingers and lateral distal shin/toes are improved.     In regards to his liver hx, patient reported he drinks 6 shots of vodka per day for the past 20 yrs, no prior admission related to liver or alcohol withdrawal. Never been to rehab. No fam hx of liver cancer, but father and brother have alcohol use d/o. He denied smoking, and drug use. Last drink was on 4/8, been sober since then. Reported that he felt nauseous, weak and noticed yellowing of his skin, so went to [facility] and was admitted there from 4/11 - 4/14 and was dx w/ alcoholic hepatitis. He was started on prednisolone 40 mg daily and was supposed to be on a taper (40mg until 4/16 with a continuous taper until 5mg on 5/21/23) with a GI follow up appointment for further workup with an MRI abdomen/pelvis and management. On admission there, labs showed elevated Tbili to 10.8 D bili 8.7    PT 16.0, CT A/P wnl, and RUQ ultrasound suspicious for acute cholecystitis with sludge but no stones. HIDA was negative. Hep serologies were neg but AFP, CA 19-9, and CEA were positive, unknown number. After discharge from hospital, he had continued left sided abd pain, headache and weakness, so presented here. Also complained of bright red blood while wiping, usually mixed w/ brown stool, occurs intermittently about once a month, no prior colonoscopy or EGD. Hepatology was consulted for alcoholic hepatitis.     Allergies:  No Known Allergies    Home Medications:  · 	amLODIPine 5 mg oral tablet: Last Dose Taken:  , 1 orally  · 	lisinopril 10 mg oral tablet: Last Dose Taken:  , 1 orally once a day  · 	MetFORMIN (Eqv-Fortamet) 500 mg oral tablet, extended release: Last Dose Taken:  , 1 orally    Hospital Medications:  amLODIPine   Tablet 5 milliGRAM(s) Oral daily  dextrose 50% Injectable 25 Gram(s) IV Push once  dextrose 50% Injectable 25 Gram(s) IV Push once  dextrose 50% Injectable 12.5 Gram(s) IV Push once  dextrose Oral Gel 15 Gram(s) Oral once PRN  folic acid 1 milliGRAM(s) Oral daily  insulin lispro (ADMELOG) corrective regimen sliding scale   SubCutaneous at bedtime  insulin lispro (ADMELOG) corrective regimen sliding scale   SubCutaneous three times a day before meals  lisinopril 10 milliGRAM(s) Oral daily  LORazepam     Tablet 2 milliGRAM(s) Oral every 2 hours PRN  LORazepam   Injectable 2 milliGRAM(s) IV Push every 1 hour PRN  multivitamin 1 Tablet(s) Oral daily  prednisoLONE    3 mG/mL Solution (ORAPRED) 40 milliGRAM(s) Oral daily  thiamine IVPB 500 milliGRAM(s) IV Intermittent daily    PMHX/PSHX:  Hypertension    Diabetes mellitus    Alcoholic hepatitis    Alcohol use disorder    No significant past surgical history    Family history:  alcohol use in brother and father.     Social History:   Tob: Denies  EtOH: Drinks 6 shots of vodka per day for 20 yrs.   Illicit Drugs: Denies    ROS:     General:  No wt loss, fevers, chills, night sweats, fatigue  Eyes:  Good vision, no reported pain  ENT:  No sore throat, pain, runny nose, dysphagia  CV:  No pain, palpitations, hypo/hypertension  Pulm:  No dyspnea, cough, tachypnea, wheezing  GI:  see HPI  :  No pain, bleeding, incontinence, nocturia  Muscle:  No pain, weakness  Neuro:  No weakness, tingling, memory problems  Psych:  No fatigue, insomnia, mood problems, depression  Endocrine:  No polyuria, polydipsia, cold/heat intolerance  Heme:  No petechiae, ecchymosis, easy bruisability  Skin:  No rash, tattoos, scars, edema    PHYSICAL EXAM:     GENERAL:  No acute distress, appears well, lying in bed.   HEENT:  NCAT, no scleral icterus   CHEST:  no respiratory distress  HEART:  Regular rate and rhythm  ABDOMEN:  Soft, non-tender, moderately distended, normoactive bowel sounds,  no masses  EXTREMITIES: No LE edema  SKIN:  No rash/erythema/ecchymoses/petechiae/wounds/abscess/warm/dry  NEURO:  Alert and oriented x 3, no asterixis    Vital Signs:  Vital Signs Last 24 Hrs  T(C): 36.6 (16 Apr 2023 12:54), Max: 36.7 (16 Apr 2023 07:21)  T(F): 97.9 (16 Apr 2023 12:54), Max: 98.1 (16 Apr 2023 07:21)  HR: 66 (16 Apr 2023 12:54) (65 - 88)  BP: 123/91 (16 Apr 2023 16:37) (123/91 - 159/100)  BP(mean): --  RR: 18 (16 Apr 2023 12:54) (16 - 18)  SpO2: 97% (16 Apr 2023 16:37) (97% - 100%)    Parameters below as of 16 Apr 2023 12:54  Patient On (Oxygen Delivery Method): room air      Daily Height in cm: 175.26 (16 Apr 2023 12:54)    Daily     LABS:                        12.6   7.76  )-----------( 240      ( 16 Apr 2023 07:40 )             38.8     Mean Cell Volume: 96.5 fL (04-16-23 @ 07:40)    04-16    131<L>  |  96<L>  |  10  ----------------------------<  331<H>  3.5   |  25  |  0.66    Ca    8.7      16 Apr 2023 07:40    TPro  6.6  /  Alb  3.2<L>  /  TBili  5.1<H>  /  DBili  x   /  AST  86<H>  /  ALT  100<H>  /  AlkPhos  313<H>  04-16    LIVER FUNCTIONS - ( 16 Apr 2023 07:40 )  Alb: 3.2 g/dL / Pro: 6.6 g/dL / ALK PHOS: 313 U/L / ALT: 100 U/L / AST: 86 U/L / GGT: x           PT/INR - ( 16 Apr 2023 07:40 )   PT: 14.2 sec;   INR: 1.22 ratio         PTT - ( 16 Apr 2023 07:40 )  PTT:35.6 sec    Amylase Serum--      Lipase [id number]       [id number]                          12.6   7.76  )-----------( 240      ( 16 Apr 2023 07:40 )             38.8       Imaging: No recent imaging.

## 2023-04-16 NOTE — H&P ADULT - NSHPPOAPRESSUREULCER_GEN_ALL_CORE
Medication Refill Request     Name oxyCODONE (ROXICODONE) 5 immediate release tablet   Dose/Frequency 1 tablet every 6 hours  Quantity 10  Verified pharmacy   [x]  Verified ordering Provider   [x]  Does patient have enough for the next 3 days?  Yes [] No [x] no

## 2023-04-16 NOTE — H&P ADULT - NSICDXPASTMEDICALHX_GEN_ALL_CORE_FT
PAST MEDICAL HISTORY:  Alcohol use disorder     Alcoholic hepatitis     Diabetes mellitus     Hypertension

## 2023-04-16 NOTE — H&P ADULT - HISTORY OF PRESENT ILLNESS
52M history of HTN, DM2 (metformin), alcohol use disorder, recent admission for alcoholic hepatitis ([facility] 4/11 - 4/14) presenting with left sided headache and acute on chronic left sided neuropathy. Patient has chronic left sided numbness and tingling of both distal arm and leg which he attributes to his work as a . However on day of admission patient woke up with severe 10/10 left sided headache associated with numbness. He never gets headaches and felt this was unusual so decided to come in. In ED, code stroke was called. CTH and CTA brain/neck was negative. After one dose of toradol patient's headache resolved, and numbness along the lateral distal arm/fingers and lateral distal shin/toes are improved.     Of note, he recently presented to [facility] for yellowing of the eyes. Workup there demonstrated elevate Tbili to 8.1, Dbli 6.3, , ALT 91 and . Imaging with CT A/P with non-specific RUQ stranding and No suspicious findings for malignancy. RUQ ultrasound suspicious for acute cholecystitis with sludge but no stones, however HIDA was negative. GI was consulted for further workup with a negative hepatitis panel, ceruloplasmin negative,  and AFP negative,  positive. Patient was diagnosed with alcoholic hepatitis and discharged on a prednisolone taper (40mg until 4/16 with a continuous taper until 5mg on 5/21/23) with a GI follow up appointment. Until 4/8/23, a few days prior to admission, he admits to drinking anywhere from 2 - 6 shots of vodka per day. Denies any episodes or hospitalizations for withdrawal. Denies any abdominal pain, although with some mild left lower quadrant discomfort. Denies any recent nausea, vomiting or change in stool. He does have a 10 lb weight loss over the last month that is unintentional.    52M history of HTN, DM2 (metformin), alcohol use disorder, recent admission for alcoholic hepatitis ([facility] 4/11 - 4/14) presenting with left sided headache and acute on chronic left sided neuropathy. Patient has chronic left sided numbness and tingling of both distal arm and leg which he attributes to his work as a . However on day of admission patient woke up with severe 10/10 left sided headache associated with numbness. He never gets headaches and felt this was unusual so decided to come in. In ED, code stroke was called. CTH and CTA brain/neck was negative. After one dose of toradol patient's headache resolved, and numbness along the lateral distal arm/fingers and lateral distal shin/toes are improved.     Of note, he recently presented to [facility] for yellowing of the eyes. Workup there demonstrated elevated Tbili to 8.1, Dbli 6.3, , ALT 91 and . CT A/P with non-specific RUQ stranding and No suspicious findings for malignancy. RUQ ultrasound suspicious for acute cholecystitis with sludge but no stones. HIDA was negative. GI was consulted for further workup with a negative hepatitis panel, ceruloplasmin,  and AF.  was positive. Patient was diagnosed with alcoholic hepatitis and discharged on a prednisolone taper (40mg until 4/16 with a continuous taper until 5mg on 5/21/23) with a GI follow up appointment for further workup with an MRI abdomen/pelvis and management. His last drink was on 4/8/23. However, prior to stopping, he was drinking from 2 - 6 shots of vodka per day. Denies any episodes or hospitalizations for withdrawal. Has had episodes of painless bright red blood with stool one month prior that has now resolved. Denies melena, hematemesis, dark emesis. Denies any abdominal pain, although with some mild left lower quadrant discomfort. He does have a 10 lb weight loss over the last month that is unintentional.    52M history of HTN, DM2 (metformin), alcohol use disorder, recent admission for alcoholic hepatitis ([facility] 4/11 - 4/14) presenting with left sided headache and acute on chronic left sided neuropathy. Patient has chronic left sided numbness and tingling of both distal arm and leg which he attributes to his work as a . However on day of admission patient woke up with severe 10/10 left sided headache associated with numbness. He never gets headaches and felt this was unusual so decided to come in. In ED, code stroke was called. CTH and CTA brain/neck was negative. After one dose of toradol patient's headache resolved, and numbness along the lateral distal arm/fingers and lateral distal shin/toes are improved.     Of note, he recently presented to [facility] for yellowing of the eyes. Workup there demonstrated elevated Tbili to 10.8 D bili 8.7    PT 16.0, CT A/P with non-specific RUQ stranding and No suspicious findings for malignancy. RUQ ultrasound suspicious for acute cholecystitis with sludge but no stones. HIDA was negative. GI was consulted for further workup with a negative hepatitis panel, ceruloplasmin,  and AF.  was positive. Patient was diagnosed with alcoholic hepatitis and discharged on a prednisolone taper (40mg until 4/16 with a continuous taper until 5mg on 5/21/23) with a GI follow up appointment for further workup with an MRI abdomen/pelvis and management. His last drink was on 4/8/23. However, prior to stopping, he was drinking from 2 - 6 shots of vodka per day. Denies any episodes or hospitalizations for withdrawal. Has had episodes of painless bright red blood with stool one month prior that has now resolved. Denies melena, hematemesis, dark emesis. Denies any abdominal pain, although with some mild left lower quadrant discomfort. He does have a 10 lb weight loss over the last month that is unintentional.

## 2023-04-16 NOTE — ED ADULT NURSE NOTE - OBJECTIVE STATEMENT
Cory RN. Patient A&Ox4 ambulatory c/o left sided headache, left sided facial and arm numbness x few hours. Cory RN. Patient A&Ox4 ambulatory c/o left sided headache, left sided facial and arm numbness x few hours. Code stroke called at triage. Please see provider note for hx. Pt presents with steady gait. Neuro exam completed and documented. No facial droop or slurred speech observed. Pt last drink 8 day ago (chronic alcohol abuse), CIWA completed and documented. Family member present at CT scan. 18G IV placed to right arm, labs collected and sent to lab. Report given to primary dayshift RN.

## 2023-04-16 NOTE — CONSULT NOTE ADULT - ASSESSMENT
Impression:     #Alcohol use d/o  #Alcoholic hepatitis  Bilirubin has declined from 10 --> 5.1 s/p steroids. Started on prednisolone 40 mg on 4/12? AST/ALT as been trending down but mildly elevated alkaline phosphatase present. No prior imaging present here. Calculated Lille score 0.064, patient has good prognosis. INR has been declining w/ normal platelets. No physical or objective signs of cirrhosis in this patient.     Recommendations:   - can continue with prednisolone 40 mg daily for now.   - Recommend obtaining hep B core IgM/IgG, hep B surface Ag, and Hep C Ab, KY, anti-smooth muscle Ab, AMA, anti-LKM.  - Please repeat AFT levels.    - Would recommend obtaining MRI/MRCP.   - Consult addiction medicine: patient is interested in rehab and medication, can consider Naltrexone/acamprosate.   - Patient will need to follow up in hepatology clinic as o/p within 1-2 weeks.   - Educated on alcohol avoidance.     All recommendations are tentative until note is attested by an attending.     Moe Jay, PGY-4  Gastroenterology/Hepatology Fellow  Available on [employer] Teams  [phone] (Short Range Pager)  [phone] (Long Range Pager)    After 5pm, please contact the on-call GI fellow. [phone]  
Assessment: 53 yo RH male with a PMHx of HTN, HLD, DM, alcohol use disorder, and recent admission to [facility] (4/11-4/14) due to concern for cholecystitis and liver dysfunction presenting to [facility] ED on 4/16 for L-sided headache associated with patchy numbness in L face/LUE/LLE. LKW 4/15 @ 2100. His current headache is left-sided, throbbing, 5/10. Associated with photophobia but no phonophobia, nausea, or vomiting. He states he has been experiencing intermittent patchy numbness on the left side of his body for > 5 years. CTH, CTA H/N all unremarkable. Labs notable for decreased RBCs (4.02), elevated INR (1.22), elevated PT (14.2), hyponatremia (Na 131), elevated glucose (331), hypoalbuminemia (3.2), elevated total bilirubin (5.1), elevated AST (86), elevated ALT (100), elevated alk phos (313), elevated ammonia (64), and elevated lipase (236).    Impression: Left-sided headache. Intermittent patchy numbness in L face/LUE/LLE. Low suspicion for acute ischemic stroke.    Recommendations:  [] Neuro checks per routine.  [] No further inpatient neuro imaging needed.  [] No need for antiplatelet medication at this time from neurology perspective.  [] For headache (give simultaneously): Toradol 30MG IVP x1 + Reglan 10MG IVP x1 + Benadryl 25MG IVP x1.  [] Would send Vitamin B1, B6, B12, and Folate.  [] Start Thiamine 100MG IV QD x3 days, then switch to Thiamine 100MG PO QD indefinitely.  [] Rest of care per primary team.  [] Patient can follow up with Dr. Michael Nissenbaum or Dr. Chris Meyer after discharge. Please instruct the patient to call [phone] to schedule this appointment. Office is located at [address], [place] [address].     Case discussed with telestroke attending Dr. Rivera. Case seen and discussed with neurology attending Dr. Kingston.

## 2023-04-16 NOTE — H&P ADULT - PROBLEM SELECTOR PLAN 6
DVT ppx: IMPROVE 0 patient ambulatory, not indicated  Diet: carb consistent  Dispo: Home On home amlodipine 5 daily, losartan 10 daily.   - c/w home medications

## 2023-04-16 NOTE — CONSULT NOTE ADULT - ATTENDING COMMENTS
Mr. Rey is a 53 yo man with no evidence clinically of a TIA or stroke - at least 5 years of intermittent left sided numbness.  I agree with work up and management as above.   Thank you.

## 2023-04-16 NOTE — H&P ADULT - ASSESSMENT
52M history of HTN, DM2 (metformin), alcohol use disorder, recent admission for alcoholic hepatitis ([facility] 4/11 - 4/14) presenting with left sided headache and acute on chronic left sided neuropathy admitted for reassessment of headache and further workup and management of suspected alcoholic hepatitis.

## 2023-04-16 NOTE — CONSULT NOTE ADULT - SUBJECTIVE AND OBJECTIVE BOX
AMANDA JACKMAN  Male  MRN-[id number]    HPI: INCOMPLETE NOTE    NIHSS:  MRS:     ROS: All negative except as mentioned in HPI.    PAST MEDICAL & SURGICAL HISTORY:  Hypertension      No significant past surgical history          FAMILY HISTORY:    SOCIAL HISTORY:    MEDICATIONS  (STANDING):    MEDICATIONS  (PRN):  LORazepam   Injectable 2 milliGRAM(s) IV Push every 1 hour PRN CIWA-Ar score 8 or greater    Allergies    No Known Allergies    Intolerances        VITAL SIGNS:  T(F): 98  HR: 85  BP: 159/100  RR: 18  SpO2: 100%    General Exam:  Constitutional: Lying on stretcher, NAD.  Head: Normocephalic, atraumatic.  Extremities: No edema.    Neuro    LABS:                RADIOLOGY:             AMANDA JACKMAN  Male  MRN-[id number]    HPI: 53 yo RH male with a PMHx of HTN, HLD, DM, alcohol use disorder, and recent admission to [facility] (4/11-4/14) due to concern for cholecystitis and liver dysfunction presenting to [facility] ED on 4/16 for L-sided headache associated with patchy numbness in L face/LUE/LLE. LKW 4/15 @ 2100. Per paperwork from recent discharge, cholecystitis was ruled out but patient was noted to have alcoholic/fatty liver disease and was discharged on steroids with the recommendation to obtain MR abdomen outpatient. Patient states he does not get frequent headaches. His current headache is left-sided, throbbing, 5/10. Associated with photophobia but no phonophobia, nausea, or vomiting. He states he has been experiencing intermittent patchy numbness on the left side of his body for > 5 years. 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 He has never seen a neurologist before. States his last alcoholic drink was on 4/8, prior to his admission to [facility]. Patient denies dizziness, vision changes, speech changes, tingling, weakness. Not a candidate for tenecteplase as patient is outside the therapeutic window. Not a candidate for MT as no LVO on imaging.     NIHSS: 1  MRS: 0    ROS: All negative except as mentioned in HPI.    PAST MEDICAL & SURGICAL HISTORY:  Hypertension    HLD    DM    Alcohol use disorder    No significant past surgical history    FAMILY HISTORY:  No family history of stroke.    SOCIAL HISTORY:  Lives with wife. History of significant alcohol use, last alcoholic drink 4/8/23.    MEDICATIONS  (PRN):  LORazepam   Injectable 2 milliGRAM(s) IV Push every 1 hour PRN CIWA-Ar score 8 or greater    Allergies    No Known Allergies    Vital Signs Last 24 Hrs  T(C): 36.7 (16 Apr 2023 07:21), Max: 36.7 (16 Apr 2023 07:21)  T(F): 98 (16 Apr 2023 07:21), Max: 98.1 (16 Apr 2023 07:21)  HR: 88 (16 Apr 2023 08:28) (85 - 88)  BP: 147/84 (16 Apr 2023 08:28) (147/84 - 159/100)  RR: 16 (16 Apr 2023 08:28) (16 - 18)  SpO2: 100% (16 Apr 2023 08:28) (100% - 100%)    Parameters below as of 16 Apr 2023 07:21  Patient On (Oxygen Delivery Method): room air    General Exam:  Constitutional: Lying on stretcher, NAD.  HEENT: Normocephalic, atraumatic, scleral icterus present bilaterally.  Extremities: No edema, no cyanosis.    Neuro Exam:   MS: Alert, eyes open spontaneously. Oriented to self, age, location, month, year. Speech is fluent, not slurred. Able to name objects and repeat. Follows commands.  CN: PERRL. VFF. EOMI, no nystagmus. Decreased sensation to LT on L face at L temple and L cheek. Face symmetric. Tongue midline.   Motor: All extremities antigravity without drift. No asterixis noted.             Deltoid	Biceps	Triceps	Wrist	Finger ABd  R	5	5	5	5	5	  L	5	5	5	5	5    	H-Flex	K-Flex	K-Ext	D-Flex	P-Flex  R	5	5	5	5	5	   L	5	5	5	5	5  Sensory: Decreased sensation to LT in patchy areas throughout LUE/LLE (mainly in L forearm and L anterior thigh), does not follow dermatome distribution. No extinction.  Reflexes: 2+ throughout. Babinski absent bilaterally.   Coordination: No dysmetria on FNF or on HTS bilaterally.  Gait: Normal gait, normal pivot.    LABS:                        12.6   7.76  )-----------( 240      ( 16 Apr 2023 07:40 )             38.8     04-16    131<L>  |  96<L>  |  10  ----------------------------<  331<H>  3.5   |  25  |  0.66    Ca    8.7      16 Apr 2023 07:40    TPro  6.6  /  Alb  3.2<L>  /  TBili  5.1<H>  /  DBili  x   /  AST  86<H>  /  ALT  100<H>  /  AlkPhos  313<H>  04-16    PT/INR - ( 16 Apr 2023 07:40 )   PT: 14.2 sec;   INR: 1.22 ratio      PTT - ( 16 Apr 2023 07:40 )  PTT:35.6 sec    RADIOLOGY:    -04/16 CTH: No acute intracranial hemorrhage, mass effect, or midline shift.  -04/16 CTA BRAIN: Patent anterior and posterior circulations without flow-limiting stenosis or vascular occlusion.  -04/16 CTA NECK: Patent anterior and posterior circulations without significant ICA stenosis as per NASCET criteria.

## 2023-04-16 NOTE — ED ADULT TRIAGE NOTE - CHIEF COMPLAINT QUOTE
numbness    pt was dc'ed from [facility] for acute cholecystitis, hepatomegaly, alcohol hepatitis and r/o gi malignancy. last drink was 8 days ago.  c/o intermittent numbness left side.. states returned at 12 midnight with left sided headache.  skin is moist.. appears tremulous.  pmhx- diabetes, htn. code stroke called

## 2023-04-16 NOTE — PATIENT PROFILE ADULT - FALL HARM RISK - UNIVERSAL INTERVENTIONS
Influenza Vaccination
Bed in lowest position, wheels locked, appropriate side rails in place/Call bell, personal items and telephone in reach/Instruct patient to call for assistance before getting out of bed or chair/Non-slip footwear when patient is out of bed/[place] to call system/Physically safe environment - no spills, clutter or unnecessary equipment/Purposeful Proactive Rounding/Room/bathroom lighting operational, light cord in reach

## 2023-04-16 NOTE — H&P ADULT - ATTENDING COMMENTS
52M with hx of EtOH use, HTN, T2DM, recent admission for alcohol hepatitis ([facility] 4/11-4/14) who presents with left sided headache, and L face/LUE/LUE numbness. VSS. Labs reviewed - Na 131, Glucose 331, Transaminitis. CTH H/N negative for acute findings. Appreciate neurology recs - low suspicion for ischemic event, no further inpatient neurological work up. Transaminitis improving from [facility] labs - imaging there notable for hepatic steatosis, ?cholecystitis however HIDA negative. At [facility] was started on prednisolone for alcoholic hepatitis, however unclear indication as MDF was 17 on presentation at OSH. Will hold off continuing prednisolone for now, current MDF 14, no need to taper steroids as patient only took for 5 days. Hepatology evaluation. FS elevated, check A1C, FS/SSI qac and hs, start basal/bolus regimen as needed. Symptom triggered CIWA. Rest of care as above. 52M with hx of EtOH use, HTN, T2DM, recent admission for alcohol hepatitis (Count includes the [facility] 4/11-4/14) who presents with left sided headache, and L face/LUE/LUE numbness. VSS. Labs reviewed - Na 131, Glucose 331, Transaminitis. CTH H/N negative for acute findings. Appreciate neurology recs - low suspicion for ischemic event, no further inpatient neurological work up. Transaminitis improving from Count includes the [facility] labs - imaging there notable for hepatic steatosis, ?cholecystitis however HIDA negative. Alcoholic Hepatitis - Count includes the [facility] 4/11 MDF 33 - patient was started on prednisolone. Continue with prednisolone 40mg daily, calculate Lille Score tomorrow AM (Day 7). Hepatology evaluation. FS elevated, check A1C, FS/SSI qac and hs, start basal/bolus regimen as needed. Symptom triggered CIWA. Rest of care as above.

## 2023-04-16 NOTE — ED ADULT NURSE NOTE - NS ED NURSE PATIENT LEFT UNIT TIME
Admission Reconciliation is Completed  Discharge Reconciliation is Not Complete Admission Reconciliation is Completed  Discharge Reconciliation is Completed 12:37

## 2023-04-16 NOTE — ED ADULT NURSE NOTE - CHIEF COMPLAINT QUOTE
numbness    pt was dc'ed from [facility] for acute cholecystitis, hepatomegaly, alcohol hepatitis and r/o gi malignancy. last drink was 8 days ago.  c/o intermittent numbness left side.. states returned at 12 midnight with left sided headache.  skin is moist.. appears tremulous.  pmhx- diabetes, htn. code stroke called

## 2023-04-16 NOTE — H&P ADULT - PROBLEM SELECTOR PLAN 4
On home metformin, recently started. Admission glucose in 300s.  - f/u A1C  - start LDSSI  - basal-bolus, titrate to glucose 110 - 180  - carb consistent diet Last drink reportedly 4/8/23. Previously, drinking 2 - 6 shots of vodka per day. No prior episodes of withdrawal.  - symptom triggered CIWA  - SBIRT  - provide IV thiamine x 3 days  - start B1, B9, MVI

## 2023-04-16 NOTE — H&P ADULT - NSHPPHYSICALEXAM_GEN_ALL_CORE
PHYSICAL EXAM:  GENERAL: NAD, lying in bed comfortably  HEAD:  Atraumatic, Normocephalic  EYES: EOMI, PERRLA, conjunctiva, SCLERAL ICTERUS MILD  ENT: Moist mucous membranes  NECK: Supple, No JVD  CHEST/LUNG: Clear to auscultation bilaterally; No rales, rhonchi, wheezing, or rubs. Unlabored respirations  HEART: Regular rate and rhythm; No murmurs, rubs, or gallops  ABDOMEN: Bowel sounds present; Soft, Nontender, Nondistended.   EXTREMITIES:  2+ Peripheral Pulses, brisk capillary refill. No clubbing, cyanosis, or edema  NERVOUS SYSTEM:  Alert & Oriented X3, speech clear. No motor deficits. Difference in sensation to touch involving lateral distal upper and lower extremity including hands and feet (suspicious for ulnar and superficial peroneal involvement)  MSK: FROM all 4 extremities, full and equal strength  SKIN: No rashes or lesions

## 2023-04-16 NOTE — H&P ADULT - SOCIAL HISTORY: ALCOHOL USE
> 20 year history of 2 - 6 shots of vodka per day, no hospitalizations for withdrawal, last drink 4/8/23

## 2023-04-17 ENCOUNTER — TRANSCRIPTION ENCOUNTER (OUTPATIENT)
Age: 53
End: 2023-04-17

## 2023-04-17 LAB
ALBUMIN SERPL ELPH-MCNC: 3 G/DL — LOW (ref 3.3–5)
ALP SERPL-CCNC: 227 U/L — HIGH (ref 40–120)
ALT FLD-CCNC: 104 U/L — HIGH (ref 4–41)
ANION GAP SERPL CALC-SCNC: 12 MMOL/L — SIGNIFICANT CHANGE UP (ref 7–14)
AST SERPL-CCNC: 86 U/L — HIGH (ref 4–40)
BILIRUB SERPL-MCNC: 4.1 MG/DL — HIGH (ref 0.2–1.2)
BUN SERPL-MCNC: 12 MG/DL — SIGNIFICANT CHANGE UP (ref 7–23)
CALCIUM SERPL-MCNC: 9 MG/DL — SIGNIFICANT CHANGE UP (ref 8.4–10.5)
CHLORIDE SERPL-SCNC: 99 MMOL/L — SIGNIFICANT CHANGE UP (ref 98–107)
CO2 SERPL-SCNC: 22 MMOL/L — SIGNIFICANT CHANGE UP (ref 22–31)
CREAT SERPL-MCNC: 0.63 MG/DL — SIGNIFICANT CHANGE UP (ref 0.5–1.3)
EGFR: 116 ML/MIN/1.73M2 — SIGNIFICANT CHANGE UP
FOLATE SERPL-MCNC: 15.4 NG/ML — SIGNIFICANT CHANGE UP (ref 3.1–17.5)
GLUCOSE BLDC GLUCOMTR-MCNC: 166 MG/DL — HIGH (ref 70–99)
GLUCOSE SERPL-MCNC: 192 MG/DL — HIGH (ref 70–99)
HBV CORE AB SER-ACNC: SIGNIFICANT CHANGE UP
HBV SURFACE AG SER-ACNC: SIGNIFICANT CHANGE UP
HCT VFR BLD CALC: 38.4 % — LOW (ref 39–50)
HCV AB S/CO SERPL IA: 0.16 S/CO — SIGNIFICANT CHANGE UP (ref 0–0.99)
HCV AB SERPL-IMP: SIGNIFICANT CHANGE UP
HGB BLD-MCNC: 12.3 G/DL — LOW (ref 13–17)
INR BLD: 1.24 RATIO — HIGH (ref 0.88–1.16)
MAGNESIUM SERPL-MCNC: 2.2 MG/DL — SIGNIFICANT CHANGE UP (ref 1.6–2.6)
MCHC RBC-ENTMCNC: 31.5 PG — SIGNIFICANT CHANGE UP (ref 27–34)
MCHC RBC-ENTMCNC: 32 GM/DL — SIGNIFICANT CHANGE UP (ref 32–36)
MCV RBC AUTO: 98.2 FL — SIGNIFICANT CHANGE UP (ref 80–100)
NRBC # BLD: 1 /100 WBCS — HIGH (ref 0–0)
NRBC # FLD: 0.07 K/UL — HIGH (ref 0–0)
PHOSPHATE SERPL-MCNC: 2.6 MG/DL — SIGNIFICANT CHANGE UP (ref 2.5–4.5)
PLATELET # BLD AUTO: 255 K/UL — SIGNIFICANT CHANGE UP (ref 150–400)
POTASSIUM SERPL-MCNC: 4 MMOL/L — SIGNIFICANT CHANGE UP (ref 3.5–5.3)
POTASSIUM SERPL-SCNC: 4 MMOL/L — SIGNIFICANT CHANGE UP (ref 3.5–5.3)
PROT SERPL-MCNC: 6.4 G/DL — SIGNIFICANT CHANGE UP (ref 6–8.3)
PROTHROM AB SERPL-ACNC: 14.4 SEC — HIGH (ref 10.5–13.4)
RBC # BLD: 3.91 M/UL — LOW (ref 4.2–5.8)
RBC # FLD: 18.6 % — HIGH (ref 10.3–14.5)
SODIUM SERPL-SCNC: 133 MMOL/L — LOW (ref 135–145)
VIT B12 SERPL-MCNC: 820 PG/ML — SIGNIFICANT CHANGE UP (ref 200–900)
WBC # BLD: 5.97 K/UL — SIGNIFICANT CHANGE UP (ref 3.8–10.5)
WBC # FLD AUTO: 5.97 K/UL — SIGNIFICANT CHANGE UP (ref 3.8–10.5)

## 2023-04-17 PROCEDURE — 99232 SBSQ HOSP IP/OBS MODERATE 35: CPT | Mod: GC

## 2023-04-17 RX ADMIN — Medication 40 MILLIGRAM(S): at 05:10

## 2023-04-17 RX ADMIN — Medication 1: at 09:09

## 2023-04-17 RX ADMIN — Medication 1 MILLIGRAM(S): at 12:55

## 2023-04-17 RX ADMIN — Medication 1: at 22:26

## 2023-04-17 RX ADMIN — LISINOPRIL 10 MILLIGRAM(S): 2.5 TABLET ORAL at 05:09

## 2023-04-17 RX ADMIN — AMLODIPINE BESYLATE 5 MILLIGRAM(S): 2.5 TABLET ORAL at 05:09

## 2023-04-17 RX ADMIN — Medication 3: at 12:55

## 2023-04-17 RX ADMIN — Medication 1 TABLET(S): at 12:55

## 2023-04-17 RX ADMIN — Medication 105 MILLIGRAM(S): at 12:54

## 2023-04-17 NOTE — PROGRESS NOTE ADULT - PROBLEM SELECTOR PLAN 3
Recently admitted for scleral icterus at [facility] (discharged 4/14/23) diagnosed with alcoholic hepatitis and discharged on prednisolone after GI consult. Discharge paperwork in patient's paper chart.   - 4/12 CT A/P with nonspecific RUQ stranding  - 4/12 RUQ U/S with hepatomegaly and fatty infiltrate, sludge in gallbladder with mild thickening  - 4/12 HIDA negative  - OSH hepatitis panel and ceruloplasmin negative  - 4/11 PT 16, Bili 10.8, MDF 33, started on prednisolone and discharged on taper  - c/w prednisolone 40mg daily, calculate Lille score in AM (Day 7)  - hepatology consult Recently admitted for scleral icterus at [facility] (discharged 4/14/23) diagnosed with alcoholic hepatitis and discharged on prednisolone after GI consult. Discharge paperwork in patient's paper chart.   - 4/12 CT A/P with nonspecific RUQ stranding  - 4/12 RUQ U/S with hepatomegaly and fatty infiltrate, sludge in gallbladder with mild thickening  - 4/12 HIDA negative  - OSH hepatitis panel and ceruloplasmin negative  - 4/11 PT 16, Bili 10.8, MDF 33, started on prednisolone and discharged on taper  - c/w prednisolone 40mg daily, Lille score 0.4, good prognosis  - appreciate hepatology recs       - MRCP ordered       - workup sent per hepatology

## 2023-04-17 NOTE — DISCHARGE NOTE PROVIDER - NSDCFUSCHEDAPPT_GEN_ALL_CORE_FT
Kathy Norris  [facility]  HEPATOLOGY [address] [place] [address]   Scheduled Appointment: 04/26/2023

## 2023-04-17 NOTE — PROGRESS NOTE ADULT - SUBJECTIVE AND OBJECTIVE BOX
Aries Browning PGY1  pager [phone] or Teams or check resident tab for coverage    Patient is a 52y old  Male who presents with a chief complaint of L-sided headache and numbness (17 Apr 2023 10:47)    SUBJECTIVE / OVERNIGHT EVENTS:    NAEO.  Patient this morning is,    Brief Daily Plan:    MEDICATIONS  MEDICATIONS  (STANDING):  amLODIPine   Tablet 5 milliGRAM(s) Oral daily  dextrose 50% Injectable 25 Gram(s) IV Push once  dextrose 50% Injectable 25 Gram(s) IV Push once  dextrose 50% Injectable 12.5 Gram(s) IV Push once  folic acid 1 milliGRAM(s) Oral daily  insulin lispro (ADMELOG) corrective regimen sliding scale   SubCutaneous at bedtime  insulin lispro (ADMELOG) corrective regimen sliding scale   SubCutaneous three times a day before meals  lisinopril 10 milliGRAM(s) Oral daily  multivitamin 1 Tablet(s) Oral daily  prednisoLONE    3 mG/mL Solution (ORAPRED) 40 milliGRAM(s) Oral daily  thiamine IVPB 500 milliGRAM(s) IV Intermittent daily    MEDICATIONS  (PRN):  dextrose Oral Gel 15 Gram(s) Oral once PRN Blood Glucose LESS THAN 70 milliGRAM(s)/deciliter      VITALS  Vital Signs Last 24 Hrs  T(C): 36.7 (17 Apr 2023 08:30), Max: 36.7 (17 Apr 2023 08:30)  T(F): 98.1 (17 Apr 2023 08:30), Max: 98.1 (17 Apr 2023 08:30)  HR: 77 (17 Apr 2023 08:30) (66 - 80)  BP: 122/71 (17 Apr 2023 08:30) (122/71 - 134/94)  BP(mean): --  RR: 18 (17 Apr 2023 08:30) (17 - 18)  SpO2: 100% (17 Apr 2023 08:30) (97% - 100%)    Parameters below as of 17 Apr 2023 08:30  Patient On (Oxygen Delivery Method): room air        PHYSICAL EXAM:  GENERAL: no distress  PSYCH: A&O x3  HEAD: Atraumatic, Normocephalic  NECK: Supple, No JVD  CHEST/LUNG: clear to auscultation bilaterally  HEART: regular rate and rhythm, no murmurs  ABDOMEN: nontender to palpation, no rebound tenderness/guarding  EXTREMITIES: no edema on bilateral LE  NEUROLOGY: no focal neurologic deficit  SKIN: No rashes or lesions    LABS:  All labs personally Reviewed.    RADIOLOGY & ADDITIONAL TESTS:  All imaging personally Reviewed.  Aries Browning PGY1  pager [phone] or Teams or check resident tab for coverage    Patient is a 52y old  Male who presents with a chief complaint of L-sided headache and numbness (17 Apr 2023 10:47)    SUBJECTIVE / OVERNIGHT EVENTS:    NAEO.  Patient this morning is doing well. Does not have any more headaches.    Brief Daily Plan:  ·	appreciate hepatology recs  ·	possible discharge today    MEDICATIONS  MEDICATIONS  (STANDING):  amLODIPine   Tablet 5 milliGRAM(s) Oral daily  dextrose 50% Injectable 25 Gram(s) IV Push once  dextrose 50% Injectable 25 Gram(s) IV Push once  dextrose 50% Injectable 12.5 Gram(s) IV Push once  folic acid 1 milliGRAM(s) Oral daily  insulin lispro (ADMELOG) corrective regimen sliding scale   SubCutaneous at bedtime  insulin lispro (ADMELOG) corrective regimen sliding scale   SubCutaneous three times a day before meals  lisinopril 10 milliGRAM(s) Oral daily  multivitamin 1 Tablet(s) Oral daily  prednisoLONE    3 mG/mL Solution (ORAPRED) 40 milliGRAM(s) Oral daily  thiamine IVPB 500 milliGRAM(s) IV Intermittent daily    MEDICATIONS  (PRN):  dextrose Oral Gel 15 Gram(s) Oral once PRN Blood Glucose LESS THAN 70 milliGRAM(s)/deciliter      VITALS  Vital Signs Last 24 Hrs  T(C): 36.7 (17 Apr 2023 08:30), Max: 36.7 (17 Apr 2023 08:30)  T(F): 98.1 (17 Apr 2023 08:30), Max: 98.1 (17 Apr 2023 08:30)  HR: 77 (17 Apr 2023 08:30) (66 - 80)  BP: 122/71 (17 Apr 2023 08:30) (122/71 - 134/94)  BP(mean): --  RR: 18 (17 Apr 2023 08:30) (17 - 18)  SpO2: 100% (17 Apr 2023 08:30) (97% - 100%)    Parameters below as of 17 Apr 2023 08:30  Patient On (Oxygen Delivery Method): room air        PHYSICAL EXAM:  GENERAL: no distress  PSYCH: A&O x3  HEAD: Atraumatic, Normocephalic  NECK: Supple, No JVD  CHEST/LUNG: clear to auscultation bilaterally  HEART: regular rate and rhythm, no murmurs  ABDOMEN: nontender to palpation, no rebound tenderness/guarding  EXTREMITIES: no edema on bilateral LE  NEUROLOGY: no focal neurologic deficit  SKIN: No rashes or lesions    LABS:  All labs personally Reviewed.    RADIOLOGY & ADDITIONAL TESTS:  All imaging personally Reviewed.

## 2023-04-17 NOTE — PROGRESS NOTE ADULT - ATTENDING COMMENTS
Pt seen/examined at bedside.  Pt stable.  CIWA scores low, will DC CIWA as patient 1 week without ETOH.  LFTs reviewed, improved.    Pt's headache now resolved, numbness at baseline.    Will discuss with hepatology for DC today with o/p follow-up and o/p MRI.    DC time 40 minutes

## 2023-04-17 NOTE — DISCHARGE NOTE PROVIDER - NSFOLLOWUPCLINICS_GEN_ALL_ED_FT
Gastroenterology at [facility]  Gastroenterology  [address] [place] [address]  Phone: [phone]  Fax:   Follow Up Time: 2 weeks    [facility]  Internal Medicine  [address] [place] [address]  Phone: [phone]  Fax: [phone]  Follow Up Time: 1 week

## 2023-04-17 NOTE — DISCHARGE NOTE PROVIDER - NSDCCPTREATMENT_GEN_ALL_CORE_FT
PRINCIPAL PROCEDURE  Procedure: CT head wo con  Findings and Treatment:   < end of copied text >  IMPRESSION:  No acute intracranial hemorrhage, mass effect, or midline shift.  Please see separately dictated report for CTA brain/neck findings.  Findings were discussed with neurology provider Jayne by Dr. Antony on   4/16/2023 7:57 AM with read back confirmation.< from: CT Head No Cont (04.16.23 @ 08:06) >        SECONDARY PROCEDURE  Procedure: CT angiogram head w contrast  Findings and Treatment:   < end of copied text >  IMPRESSION:  CTA BRAIN: Patent anterior and posterior circulations without   flow-limiting stenosis or vascular occlusion.  CTA NECK: Patent anterior and posterior circulations without significant   ICA stenosis as per NASCET criteria.< from: CT Angio Neck Stroke Protocol w/ IV Cont (04.16.23 @ 08:04) >

## 2023-04-17 NOTE — DISCHARGE NOTE PROVIDER - NSDCMRMEDTOKEN_GEN_ALL_CORE_FT
amLODIPine 5 mg oral tablet: 1 orally  lisinopril 10 mg oral tablet: 1 orally once a day  MetFORMIN (Eqv-Fortamet) 500 mg oral tablet, extended release: 1 orally   amLODIPine 5 mg oral tablet: 1 orally  lisinopril 10 mg oral tablet: 1 orally once a day  MetFORMIN (Eqv-Fortamet) 500 mg oral tablet, extended release: 1 orally  prednisoLONE (as sodium phosphate) 15 mg/5 mL oral liquid: 13.33 milliliter(s) orally once a day

## 2023-04-17 NOTE — PROGRESS NOTE ADULT - ASSESSMENT
Impression:     #Alcohol use d/o  #Alcoholic hepatitis  Bilirubin has declined from 10 --> 5.1 s/p steroids. Started on prednisolone 40 mg on 4/12? AST/ALT as been trending down but mildly elevated alkaline phosphatase present. No prior imaging present here. Calculated Lille score 0.064, patient has good prognosis. INR has been declining w/ normal platelets. No physical or objective signs of cirrhosis in this patient.     Recommendations:   - continue with prednisolone 40 mg daily - Lille score to be calculate tomorrow (d7) however improving. Will likely complete course of 28d for alc hep  - Recommend obtaining hep B core IgM/IgG, hep B surface Ag, and Hep C Ab, KY, anti-smooth muscle Ab, AMA, anti-LKM.  - Please repeat AFP levels.    - Would recommend obtaining MRI/MRCP.   - Patient will need to follow up in hepatology clinic as o/p within 2 weeks.   - trend CBC, CMP, coags daily    Note not finalized until signed by attending.    Fannie Douglas PGY-6  Gastroenterology/Hepatology Fellow  Pager #[phone] (TEVIN) or [phone] (NS)  Available on [employer] Teams.  Please contact on-call GI fellow via answering service ([phone]) after 5pm and before 8am, and on weekends.

## 2023-04-17 NOTE — PROGRESS NOTE ADULT - SUBJECTIVE AND OBJECTIVE BOX
Chief Complaint:  Patient is a 52y old  Male who presents with a chief complaint of L-sided headache and numbness (16 Apr 2023 16:51)      Interval Events: no acute events overnight  - reports abd pain improved, HA improved and LLE numbness improved  - tolerating regular diet - no n/v      Hospital Medications:  amLODIPine   Tablet 5 milliGRAM(s) Oral daily  dextrose 50% Injectable 25 Gram(s) IV Push once  dextrose 50% Injectable 25 Gram(s) IV Push once  dextrose 50% Injectable 12.5 Gram(s) IV Push once  dextrose Oral Gel 15 Gram(s) Oral once PRN  folic acid 1 milliGRAM(s) Oral daily  insulin lispro (ADMELOG) corrective regimen sliding scale   SubCutaneous at bedtime  insulin lispro (ADMELOG) corrective regimen sliding scale   SubCutaneous three times a day before meals  lisinopril 10 milliGRAM(s) Oral daily  multivitamin 1 Tablet(s) Oral daily  prednisoLONE    3 mG/mL Solution (ORAPRED) 40 milliGRAM(s) Oral daily  thiamine IVPB 500 milliGRAM(s) IV Intermittent daily      PMHX/PSHX:  Hypertension    Diabetes mellitus    Alcoholic hepatitis    Alcohol use disorder    No significant past surgical history            ROS:     General:  No weight loss, fevers, chills, night sweats, fatigue   Eyes:  No vision changes  ENT:  No sore throat, pain, runny nose  CV:  No chest pain, palpitations, dizziness   Resp:  No SOB, cough, wheezing  GI:  See HPI  :  No burning with urination, hematuria  Muscle:  No pain, weakness  Neuro:  No weakness/tingling, memory problems  Psych:  No fatigue, insomnia, mood problems, depression  Heme:  No easy bruisability  Skin:  No rash, edema      PHYSICAL EXAM:     GENERAL:  no distress  HEENT:  NC/AT,  conjunctivae clear, scleral icterus  CHEST:  Full & symmetric excursion, no increased effort w/ respirations  HEART:  Regular rhythm & rate  ABDOMEN:  Soft, non-tender, non-distended  EXTREMITIES:  no LE  edema  SKIN:  No rash/erythema/ecchymoses/petechiae/wounds/jaundice  NEURO:  Alert, oriented    Vital Signs:  Vital Signs Last 24 Hrs  T(C): 36.7 (17 Apr 2023 08:30), Max: 36.7 (16 Apr 2023 11:22)  T(F): 98.1 (17 Apr 2023 08:30), Max: 98.1 (16 Apr 2023 11:22)  HR: 77 (17 Apr 2023 08:30) (65 - 80)  BP: 122/71 (17 Apr 2023 08:30) (122/71 - 134/94)  BP(mean): --  RR: 18 (17 Apr 2023 08:30) (17 - 18)  SpO2: 100% (17 Apr 2023 08:30) (97% - 100%)    Parameters below as of 17 Apr 2023 08:30  Patient On (Oxygen Delivery Method): room air      Daily Height in cm: 175.26 (16 Apr 2023 12:54)    Daily     LABS:                        12.3   5.97  )-----------( 255      ( 17 Apr 2023 06:50 )             38.4     04-17    133<L>  |  99  |  12  ----------------------------<  192<H>  4.0   |  22  |  0.63    Ca    9.0      17 Apr 2023 06:50  Phos  2.6     04-17  Mg     2.20     04-17    TPro  6.4  /  Alb  3.0<L>  /  TBili  4.1<H>  /  DBili  x   /  AST  86<H>  /  ALT  104<H>  /  AlkPhos  227<H>  04-17    LIVER FUNCTIONS - ( 17 Apr 2023 06:50 )  Alb: 3.0 g/dL / Pro: 6.4 g/dL / ALK PHOS: 227 U/L / ALT: 104 U/L / AST: 86 U/L / GGT: x           PT/INR - ( 17 Apr 2023 06:50 )   PT: 14.4 sec;   INR: 1.24 ratio         PTT - ( 16 Apr 2023 07:40 )  PTT:35.6 sec        Imaging:

## 2023-04-17 NOTE — PROGRESS NOTE ADULT - ASSESSMENT
52M history of HTN, DM2 (metformin), alcohol use disorder, recent admission for alcoholic hepatitis ([facility] 4/11 - 4/14) presenting with left sided headache and acute on chronic left sided neuropathy admitted for reassessment of headache and further workup and management of suspected alcoholic hepatitis.  52M history of HTN, DM2 (metformin), alcohol use disorder, recent admission for alcoholic hepatitis ([facility] 4/11 - 4/14) presenting with left sided headache and acute on chronic left sided neuropathy admitted for reassessment of headache and further workup and management of suspected alcoholic hepatitis.

## 2023-04-17 NOTE — DISCHARGE NOTE PROVIDER - HOSPITAL COURSE
Discharge Summary     Admission diagnoses:   Headache    Discharge diagnoses:   alcoholic hepatitis and headache    Hospital Course:   For full details, please see H&P, progress notes, consult notes and ancillary notes. Briefly, 52M history of HTN, DM2 (metformin), alcohol use disorder, recent admission for alcoholic hepatitis ([facility] 4/11 - 4/14) presenting with left sided headache and acute on chronic left sided neuropathy admitted for reassessment of headache and further workup and management of suspected alcoholic hepatitis.     On day of discharge, patient is clinically stable with no new exam findings or acute symptoms compared to prior. The patient was seen by the attending physician on the date of discharge and deemed stable and acceptable for discharge. The patient's chronic medical conditions were treated accordingly per the patient's home medication regimen. The patient's medication reconciliation (with changes made to chronic medications), follow up appointments, discharge orders, instructions, and significant lab and diagnostic studies are as noted.     Discharge follow up action items:     1. Follow up with PCP in 1-2 weeks.   2. Medication changes ***    Patient's ordered code status: ***    Patient disposition: ***     Discharge Summary     Admission diagnoses:   Headache    Discharge diagnoses:   alcoholic hepatitis and headache    Hospital Course:   For full details, please see H&P, progress notes, consult notes and ancillary notes. Briefly, 52M history of HTN, DM2 (metformin), alcohol use disorder, recent admission for alcoholic hepatitis ([facility] 4/11 - 4/14) presenting with left sided headache and acute on chronic left sided neuropathy admitted for reassessment of headache and further workup and management of suspected alcoholic hepatitis. Headache resolved on day of admission. Chronic left sided neuropathy likely in setting of work +/- contribution from alcohol use stable. Hepatology was consulted for alcoholic hepatitis. Patient recommended to continue prednisolone and follow up with hepatology in 1 - 2 weeks.     On day of discharge, patient is clinically stable with no new exam findings or acute symptoms compared to prior. The patient was seen by the attending physician on the date of discharge and deemed stable and acceptable for discharge. The patient's chronic medical conditions were treated accordingly per the patient's home medication regimen. The patient's medication reconciliation (with changes made to chronic medications), follow up appointments, discharge orders, instructions, and significant lab and diagnostic studies are as noted.     Discharge follow up action items:     1. Follow up with   PCP in 1-2 weeks.   Hepatology in 1 - 2 weeks  2. Medication changes   prednisolone 40 mg for 28 day total course    Patient's ordered code status: Full    Patient disposition: Home     Discharge Summary     Admission diagnoses:   Headache    Discharge diagnoses:   alcoholic hepatitis and headache    Hospital Course:   For full details, please see H&P, progress notes, consult notes and ancillary notes. Briefly, 52M history of HTN, DM2 (metformin), alcohol use disorder, recent admission for alcoholic hepatitis ([facility] 4/11 - 4/14) presenting with left sided headache and acute on chronic left sided neuropathy admitted for reassessment of headache and further workup and management of suspected alcoholic hepatitis. Headache resolved on day of admission. Chronic left sided neuropathy likely in setting of work +/- contribution from alcohol use stable. Hepatology was consulted for alcoholic hepatitis. Patient recommended to continue prednisolone and follow up with hepatology in 1 - 2 weeks.     On day of discharge, patient is clinically stable with no new exam findings or acute symptoms compared to prior. The patient was seen by the attending physician on the date of discharge and deemed stable and acceptable for discharge. The patient's chronic medical conditions were treated accordingly per the patient's home medication regimen. The patient's medication reconciliation (with changes made to chronic medications), follow up appointments, discharge orders, instructions, and significant lab and diagnostic studies are as noted.     Discharge follow up action items:     1. Follow up with   PCP in 1-2 weeks.   Hepatology in 1 - 2 weeks. Can obtain MRI/MRCP outpatient   2. Medication changes   prednisolone 40 mg for 28 day total course    Patient's ordered code status: Full    Patient disposition: Home

## 2023-04-17 NOTE — DISCHARGE NOTE PROVIDER - PROVIDER TOKENS
PROVIDER:[TOKEN:[[id number]],FOLLOWUP:[1 week]] PROVIDER:[TOKEN:[[id number]],FOLLOWUP:[2 weeks]]

## 2023-04-17 NOTE — DISCHARGE NOTE PROVIDER - NSDCCPCAREPLAN_GEN_ALL_CORE_FT
PRINCIPAL DISCHARGE DIAGNOSIS  Diagnosis: Acute headache  Assessment and Plan of Treatment: You presented with severe left sided headache. You were worked up for a possible stroke with CT imaging studies. These were all negative and your headache resolved.  If you have another headache with weakness or change in sensation please return to the hospital for further evaluation.      SECONDARY DISCHARGE DIAGNOSES  Diagnosis: Alcoholic hepatitis  Assessment and Plan of Treatment: You were recently admitted to [facility] for alcoholic hepatitis and you were discharged on prednisolone. We consulted our hepatologists and they agreed that this was likely alcohlic hepatitis. Please continue to abstain from any alcohol use. Any more would be dangerous for your health and well being going forward. Please continue to take the prednisolone as prescribed below. Please call the numbers provided to schedule an MRI for your liver and follow up appointment with our hepatologists.  If you have increasing nausea, vomiting, worsening yellowing of the eyes, abdominal swelling, fever please call your primary care physician or gastroenterologist for further guidance.

## 2023-04-17 NOTE — DISCHARGE NOTE PROVIDER - CARE PROVIDER_API CALL
Ld Tanner; MBBS)  Internal Medicine  [address] [place] [address]  Phone: [phone]  Fax: [phone]  Follow Up Time: 1 week   Kathy Norris (DO)  Gastroenterology; Transplant Hepatology  [address] [place] [address]  Phone: [phone]  Fax: [phone]  Follow Up Time: 2 weeks

## 2023-04-17 NOTE — DISCHARGE NOTE PROVIDER - NSDCFUADDAPPT_GEN_ALL_CORE_FT
1. Please call to schedule a follow up visit with our liver doctors "hepatologists" in 1 - 2 weeks at the number above.  2. Please see your primary care physician in 1 - 2 weeks. If you would like to see one of our physicians please call the internal medicine number above to schedule an appointment.  1. Please call to schedule a follow up visit with our liver doctors "hepatologists" in 1 - 2 weeks at the number above.  2. Please see your primary care physician in 1 - 2 weeks. If you would like to see one of our physicians please call the internal medicine number above to schedule an appointment.  3. Please call [phone] to schedule an outpatient MRI in 1 - 2 weeks for your liver. This test is "MRCP with and without contrast".

## 2023-04-18 ENCOUNTER — TRANSCRIPTION ENCOUNTER (OUTPATIENT)
Age: 53
End: 2023-04-18

## 2023-04-18 VITALS
OXYGEN SATURATION: 100 % | RESPIRATION RATE: 18 BRPM | HEART RATE: 85 BPM | SYSTOLIC BLOOD PRESSURE: 134 MMHG | TEMPERATURE: 98 F | DIASTOLIC BLOOD PRESSURE: 73 MMHG

## 2023-04-18 LAB
ALBUMIN SERPL ELPH-MCNC: 3 G/DL — LOW (ref 3.3–5)
ALP SERPL-CCNC: 196 U/L — HIGH (ref 40–120)
ALT FLD-CCNC: 96 U/L — HIGH (ref 4–41)
ANION GAP SERPL CALC-SCNC: 12 MMOL/L — SIGNIFICANT CHANGE UP (ref 7–14)
AST SERPL-CCNC: 80 U/L — HIGH (ref 4–40)
BILIRUB SERPL-MCNC: 3.5 MG/DL — HIGH (ref 0.2–1.2)
BUN SERPL-MCNC: 12 MG/DL — SIGNIFICANT CHANGE UP (ref 7–23)
CALCIUM SERPL-MCNC: 8.6 MG/DL — SIGNIFICANT CHANGE UP (ref 8.4–10.5)
CHLORIDE SERPL-SCNC: 101 MMOL/L — SIGNIFICANT CHANGE UP (ref 98–107)
CO2 SERPL-SCNC: 24 MMOL/L — SIGNIFICANT CHANGE UP (ref 22–31)
CREAT SERPL-MCNC: 0.77 MG/DL — SIGNIFICANT CHANGE UP (ref 0.5–1.3)
EGFR: 109 ML/MIN/1.73M2 — SIGNIFICANT CHANGE UP
GLUCOSE SERPL-MCNC: 161 MG/DL — HIGH (ref 70–99)
HCT VFR BLD CALC: 38.5 % — LOW (ref 39–50)
HGB BLD-MCNC: 12 G/DL — LOW (ref 13–17)
MAGNESIUM SERPL-MCNC: 2.3 MG/DL — SIGNIFICANT CHANGE UP (ref 1.6–2.6)
MCHC RBC-ENTMCNC: 31 PG — SIGNIFICANT CHANGE UP (ref 27–34)
MCHC RBC-ENTMCNC: 31.2 GM/DL — LOW (ref 32–36)
MCV RBC AUTO: 99.5 FL — SIGNIFICANT CHANGE UP (ref 80–100)
MITOCHONDRIA AB SER-ACNC: SIGNIFICANT CHANGE UP
NRBC # BLD: 1 /100 WBCS — HIGH (ref 0–0)
NRBC # FLD: 0.07 K/UL — HIGH (ref 0–0)
PHOSPHATE SERPL-MCNC: 3.2 MG/DL — SIGNIFICANT CHANGE UP (ref 2.5–4.5)
PLATELET # BLD AUTO: 276 K/UL — SIGNIFICANT CHANGE UP (ref 150–400)
POTASSIUM SERPL-MCNC: 4 MMOL/L — SIGNIFICANT CHANGE UP (ref 3.5–5.3)
POTASSIUM SERPL-SCNC: 4 MMOL/L — SIGNIFICANT CHANGE UP (ref 3.5–5.3)
PROT SERPL-MCNC: 6.3 G/DL — SIGNIFICANT CHANGE UP (ref 6–8.3)
RBC # BLD: 3.87 M/UL — LOW (ref 4.2–5.8)
RBC # FLD: 18.8 % — HIGH (ref 10.3–14.5)
SMOOTH MUSCLE AB SER-ACNC: SIGNIFICANT CHANGE UP
SODIUM SERPL-SCNC: 137 MMOL/L — SIGNIFICANT CHANGE UP (ref 135–145)
WBC # BLD: 6.77 K/UL — SIGNIFICANT CHANGE UP (ref 3.8–10.5)
WBC # FLD AUTO: 6.77 K/UL — SIGNIFICANT CHANGE UP (ref 3.8–10.5)

## 2023-04-18 PROCEDURE — ZZZZZ: CPT

## 2023-04-18 PROCEDURE — 99232 SBSQ HOSP IP/OBS MODERATE 35: CPT | Mod: GC

## 2023-04-18 RX ORDER — PREDNISOLONE 5 MG
13.33 TABLET ORAL
Qty: 373.24 | Refills: 0
Start: 2023-04-18 | End: 2023-05-15

## 2023-04-18 RX ORDER — PREDNISOLONE 5 MG
40 TABLET ORAL
Qty: 13.3 | Refills: 0
Start: 2023-04-18 | End: 2023-05-15

## 2023-04-18 RX ADMIN — Medication 1: at 09:11

## 2023-04-18 RX ADMIN — Medication 105 MILLIGRAM(S): at 12:55

## 2023-04-18 RX ADMIN — Medication 2: at 12:55

## 2023-04-18 RX ADMIN — Medication 1 TABLET(S): at 12:55

## 2023-04-18 RX ADMIN — Medication 1 MILLIGRAM(S): at 12:56

## 2023-04-18 NOTE — PROGRESS NOTE ADULT - ASSESSMENT
Impression:     #Alcohol use d/o  #Alcoholic hepatitis  Bilirubin has declined from 10 --> 5.1 s/p steroids. Started on prednisolone 40 mg on 4/12? AST/ALT as been trending down but mildly elevated alkaline phosphatase present. No prior imaging present here. Calculated Lille score 0.064, patient has good prognosis. INR has been declining w/ normal platelets. No physical or objective signs of cirrhosis in this patient.     Recommendations:   - continue with prednisolone 40 mg daily - Lille score indicates good prognosis/response to steroids - will likely complete course of 28d for alc hep  - f/u repeat AFP levels.    - Would recommend obtaining MRI/MRCP.   - Patient will need to follow up in hepatology clinic as o/p within 2 weeks.   - trend CBC, CMP, coags daily  - emphasized alcohol cessation    Note incomplete until finalized by attending signature/attestation.    Waldo Pelletier  GI/Hepatology Fellow    MONDAY-FRIDAY 8AM-5PM:  Pager# [phone] ([facility]) or [phone] ([facility])    NON-URGENT CONSULTS:  Please email [email or web address]  AT NIGHT AND ON WEEKENDS:  Contact on-call GI fellow via answering service ([phone]) from 5pm-8am and on weekends/holidays             Impression:     #Alcohol use d/o  #Alcoholic hepatitis  Bilirubin has declined from 10 --> 5.1 s/p steroids. Started on prednisolone 40 mg on 4/12? AST/ALT as been trending down but mildly elevated alkaline phosphatase present. No prior imaging present here. Calculated Lille score 0.064, patient has good prognosis. INR has been declining w/ normal platelets. No physical or objective signs of cirrhosis in this patient.     Recommendations:   - continue with prednisolone 40 mg daily - Lille score indicates good prognosis/response to steroids - will likely complete course of 28d for alc hep  - f/u repeat AFP levels.    - Would recommend obtaining MRI/MRCP.   - Patient will need to follow up in hepatology clinic as o/p within 2 weeks.  Please call Dr. Norris's office at [phone] to confirm appointment  - trend CBC, CMP, coags daily  - emphasized alcohol cessation    Note incomplete until finalized by attending signature/attestation.    Waldo Pelletier  GI/Hepatology Fellow    MONDAY-FRIDAY 8AM-5PM:  Pager# [phone] ([facility]) or [phone] ([facility])    NON-URGENT CONSULTS:  Please email [email or web address] OR [email or web address]  AT NIGHT AND ON WEEKENDS:  Contact on-call GI fellow via answering service ([phone]) from 5pm-8am and on weekends/holidays

## 2023-04-18 NOTE — PROGRESS NOTE ADULT - PROBLEM SELECTOR PLAN 3
Recently admitted for scleral icterus at [facility] (discharged 4/14/23) diagnosed with alcoholic hepatitis and discharged on prednisolone after GI consult. Discharge paperwork in patient's paper chart.   - 4/12 CT A/P with nonspecific RUQ stranding  - 4/12 RUQ U/S with hepatomegaly and fatty infiltrate, sludge in gallbladder with mild thickening  - 4/12 HIDA negative  - OSH hepatitis panel and ceruloplasmin negative  - 4/11 PT 16, Bili 10.8, MDF 33, started on prednisolone and discharged on taper  - c/w prednisolone 40mg daily, Lille score 0.4, good prognosis  - appreciate hepatology recs       - MRCP ordered       - workup sent per hepatology Chronic neuropathy involving left lateral distal arm, 5th finger, left lateral distal shin, 5th toe.   - Suspect ulnar and superficial peroneal distribution  - Most likely ddx includes work-related compression (), alcohol peripheral neuropathy  - Currently at baseline  - CTM

## 2023-04-18 NOTE — SBIRT NOTE ADULT - NSSBIRTALCPASSREFTXDET_GEN_A_CORE
SW attempted to contact patient's choice for outpatient tx - [facility] [phone] but location currently closed. Informed pt that SW will submit referral and follow up with him via phone after dc vs facility contacting patient directly. Provided list of substance us tx centers from [facility] as well as [facility]

## 2023-04-18 NOTE — PROGRESS NOTE ADULT - PROBLEM SELECTOR PLAN 6
On home amlodipine 5 daily, losartan 10 daily.   - c/w home medications
On home amlodipine 5 daily, losartan 10 daily.   - c/w home medications

## 2023-04-18 NOTE — PROGRESS NOTE ADULT - SUBJECTIVE AND OBJECTIVE BOX
Aries Browning PGY1  pager [phone] or Teams or check resident tab for coverage    Patient is a 52y old  Male who presents with a chief complaint of L-sided headache and numbness (17 Apr 2023 14:39)    SUBJECTIVE / OVERNIGHT EVENTS:    NAEO.  Patient this morning is,    Brief Daily Plan:    MEDICATIONS  MEDICATIONS  (STANDING):  amLODIPine   Tablet 5 milliGRAM(s) Oral daily  dextrose 50% Injectable 12.5 Gram(s) IV Push once  dextrose 50% Injectable 25 Gram(s) IV Push once  dextrose 50% Injectable 25 Gram(s) IV Push once  folic acid 1 milliGRAM(s) Oral daily  insulin lispro (ADMELOG) corrective regimen sliding scale   SubCutaneous at bedtime  insulin lispro (ADMELOG) corrective regimen sliding scale   SubCutaneous three times a day before meals  lisinopril 10 milliGRAM(s) Oral daily  multivitamin 1 Tablet(s) Oral daily  prednisoLONE    3 mG/mL Solution (ORAPRED) 40 milliGRAM(s) Oral daily  thiamine IVPB 500 milliGRAM(s) IV Intermittent daily    MEDICATIONS  (PRN):  dextrose Oral Gel 15 Gram(s) Oral once PRN Blood Glucose LESS THAN 70 milliGRAM(s)/deciliter      VITALS  Vital Signs Last 24 Hrs  T(C): 36.5 (18 Apr 2023 05:46), Max: 36.8 (17 Apr 2023 14:32)  T(F): 97.7 (18 Apr 2023 05:46), Max: 98.3 (17 Apr 2023 21:47)  HR: 66 (18 Apr 2023 05:46) (66 - 81)  BP: 129/90 (18 Apr 2023 05:46) (120/88 - 133/61)  BP(mean): --  RR: 18 (18 Apr 2023 05:46) (18 - 18)  SpO2: 98% (18 Apr 2023 05:46) (97% - 100%)    Parameters below as of 18 Apr 2023 05:46  Patient On (Oxygen Delivery Method): room air        PHYSICAL EXAM:  GENERAL: no distress  PSYCH: A&O x3  HEAD: Atraumatic, Normocephalic  NECK: Supple, No JVD  CHEST/LUNG: clear to auscultation bilaterally  HEART: regular rate and rhythm, no murmurs  ABDOMEN: nontender to palpation, no rebound tenderness/guarding  EXTREMITIES: no edema on bilateral LE  NEUROLOGY: no focal neurologic deficit  SKIN: No rashes or lesions    LABS:  All labs personally Reviewed.    RADIOLOGY & ADDITIONAL TESTS:  All imaging personally Reviewed.  Aries Browning PGY1  pager [phone] or Teams or check resident tab for coverage    Patient is a 52y old  Male who presents with a chief complaint of L-sided headache and numbness (17 Apr 2023 14:39)    SUBJECTIVE / OVERNIGHT EVENTS:    NAEO.  Patient this morning is doing well. Denies any concerns or complaints. No headache.     Brief Daily Plan:  ·	c/w prednisolone  ·	discharge today    MEDICATIONS  MEDICATIONS  (STANDING):  amLODIPine   Tablet 5 milliGRAM(s) Oral daily  dextrose 50% Injectable 12.5 Gram(s) IV Push once  dextrose 50% Injectable 25 Gram(s) IV Push once  dextrose 50% Injectable 25 Gram(s) IV Push once  folic acid 1 milliGRAM(s) Oral daily  insulin lispro (ADMELOG) corrective regimen sliding scale   SubCutaneous at bedtime  insulin lispro (ADMELOG) corrective regimen sliding scale   SubCutaneous three times a day before meals  lisinopril 10 milliGRAM(s) Oral daily  multivitamin 1 Tablet(s) Oral daily  prednisoLONE    3 mG/mL Solution (ORAPRED) 40 milliGRAM(s) Oral daily  thiamine IVPB 500 milliGRAM(s) IV Intermittent daily    MEDICATIONS  (PRN):  dextrose Oral Gel 15 Gram(s) Oral once PRN Blood Glucose LESS THAN 70 milliGRAM(s)/deciliter      VITALS  Vital Signs Last 24 Hrs  T(C): 36.5 (18 Apr 2023 05:46), Max: 36.8 (17 Apr 2023 14:32)  T(F): 97.7 (18 Apr 2023 05:46), Max: 98.3 (17 Apr 2023 21:47)  HR: 66 (18 Apr 2023 05:46) (66 - 81)  BP: 129/90 (18 Apr 2023 05:46) (120/88 - 133/61)  BP(mean): --  RR: 18 (18 Apr 2023 05:46) (18 - 18)  SpO2: 98% (18 Apr 2023 05:46) (97% - 100%)    Parameters below as of 18 Apr 2023 05:46  Patient On (Oxygen Delivery Method): room air        PHYSICAL EXAM:  GENERAL: no distress  PSYCH: A&O x3  HEAD: Atraumatic, Normocephalic  NECK: Supple, No JVD  CHEST/LUNG: clear to auscultation bilaterally  HEART: regular rate and rhythm, no murmurs  ABDOMEN: nontender to palpation, no rebound tenderness/guarding  EXTREMITIES: no edema on bilateral LE  NEUROLOGY: no focal neurologic deficit  SKIN: No rashes or lesions    LABS:  All labs personally Reviewed.    RADIOLOGY & ADDITIONAL TESTS:  All imaging personally Reviewed.

## 2023-04-18 NOTE — PROGRESS NOTE ADULT - PROBLEM SELECTOR PLAN 7
DVT ppx: IMPROVE 0 patient ambulatory, not indicated  Diet: carb consistent  Dispo: Home
DVT ppx: IMPROVE 0 patient ambulatory, not indicated  Diet: carb consistent  Dispo: Home

## 2023-04-18 NOTE — PROGRESS NOTE ADULT - PROBLEM SELECTOR PLAN 1
Presenting with left sided 10/10 headache on 4/16 associated with acute on chronic peripheral neuropathy. No prior episodes.   - Code stroke called on 4/16  - CTH and CTA brain/neck negative  - resolved with toradol  - CTM  - Appreciate neurology recs Recently admitted for scleral icterus at [facility] (discharged 4/14/23) diagnosed with alcoholic hepatitis and discharged on prednisolone after GI consult. Discharge paperwork in patient's paper chart.   - 4/12 CT A/P with nonspecific RUQ stranding  - 4/12 RUQ U/S with hepatomegaly and fatty infiltrate, sludge in gallbladder with mild thickening  - 4/12 HIDA negative  - OSH hepatitis panel and ceruloplasmin negative  - 4/11 PT 16, Bili 10.8, MDF 33, started on prednisolone and discharged on taper  - c/w prednisolone 40mg daily, Lille score 0.4, good prognosis  - appreciate hepatology recs       - MRCP ordered       - workup sent per hepatology

## 2023-04-18 NOTE — PROGRESS NOTE ADULT - PROBLEM SELECTOR PLAN 5
On home metformin, recently started. Admission glucose in 300s.  - f/u A1C  - start LDSSI  - basal-bolus, titrate to glucose 110 - 180  - carb consistent diet
On home metformin, recently started. Admission glucose in 300s.  - f/u A1C  - start LDSSI  - basal-bolus, titrate to glucose 110 - 180  - carb consistent diet

## 2023-04-18 NOTE — DISCHARGE NOTE NURSING/CASE MANAGEMENT/SOCIAL WORK - NSDCFUADDAPPT_GEN_ALL_CORE_FT
1. Please call to schedule a follow up visit with our liver doctors "hepatologists" in 1 - 2 weeks at the number above.  2. Please see your primary care physician in 1 - 2 weeks. If you would like to see one of our physicians please call the internal medicine number above to schedule an appointment.  3. Please call [phone] to schedule an outpatient MRI in 1 - 2 weeks for your liver. This test is "MRCP with and without contrast".

## 2023-04-18 NOTE — DISCHARGE NOTE NURSING/CASE MANAGEMENT/SOCIAL WORK - PATIENT PORTAL LINK FT
You can access the FollowMyHealth Patient Portal offered by [facility] by registering at the following website: [email or web address]. By joining [facility]’s FollowMyHealth portal, you will also be able to view your health information using other applications (apps) compatible with our system.

## 2023-04-18 NOTE — PROGRESS NOTE ADULT - SUBJECTIVE AND OBJECTIVE BOX
Call Attempt #: 1    Phone call attempt to schedule patient for breast cancer screening.  Unable to reach patient.  Voicemail left for patient.   Interval Events:   No acute events overnight.  Patient without acute symptoms at this time.    ROS:   12 point review of systems performed and negative except otherwise noted in HPI.    Hospital Medications:  amLODIPine   Tablet 5 milliGRAM(s) Oral daily  dextrose 50% Injectable 25 Gram(s) IV Push once  dextrose 50% Injectable 25 Gram(s) IV Push once  dextrose 50% Injectable 12.5 Gram(s) IV Push once  dextrose Oral Gel 15 Gram(s) Oral once PRN  folic acid 1 milliGRAM(s) Oral daily  insulin lispro (ADMELOG) corrective regimen sliding scale   SubCutaneous at bedtime  insulin lispro (ADMELOG) corrective regimen sliding scale   SubCutaneous three times a day before meals  lisinopril 10 milliGRAM(s) Oral daily  multivitamin 1 Tablet(s) Oral daily  prednisoLONE    3 mG/mL Solution (ORAPRED) 40 milliGRAM(s) Oral daily  thiamine IVPB 500 milliGRAM(s) IV Intermittent daily      PHYSICAL EXAM:   Vital Signs:  Vital Signs Last 24 Hrs  T(C): 36.5 (18 Apr 2023 05:46), Max: 36.8 (17 Apr 2023 14:32)  T(F): 97.7 (18 Apr 2023 05:46), Max: 98.3 (17 Apr 2023 21:47)  HR: 66 (18 Apr 2023 05:46) (66 - 81)  BP: 129/90 (18 Apr 2023 05:46) (120/88 - 133/61)  BP(mean): --  RR: 18 (18 Apr 2023 05:46) (18 - 18)  SpO2: 98% (18 Apr 2023 05:46) (97% - 100%)    Parameters below as of 18 Apr 2023 05:46  Patient On (Oxygen Delivery Method): room air      Daily     Daily     GENERAL: no acute distress  NEURO: alert  HEENT: NCAT, no conjunctival pallor appreciated  CHEST: no respiratory distress, no accessory muscle use  CARDIAC: regular rate, +S1/S2  ABDOMEN: soft, nontender, no rebound or guarding  EXTREMITIES: warm, well perfused  SKIN: no lesions noted    LABS: reviewed                        12.0   6.77  )-----------( 276      ( 18 Apr 2023 06:20 )             38.5     04-18    137  |  101  |  12  ----------------------------<  161<H>  4.0   |  24  |  0.77    Ca    8.6      18 Apr 2023 06:20  Phos  3.2     04-18  Mg     2.30     04-18    TPro  6.3  /  Alb  3.0<L>  /  TBili  3.5<H>  /  DBili  x   /  AST  80<H>  /  ALT  96<H>  /  AlkPhos  196<H>  04-18    LIVER FUNCTIONS - ( 18 Apr 2023 06:20 )  Alb: 3.0 g/dL / Pro: 6.3 g/dL / ALK PHOS: 196 U/L / ALT: 96 U/L / AST: 80 U/L / GGT: x             Interval Diagnostic Studies: see sunrise for full report

## 2023-04-18 NOTE — SBIRT NOTE ADULT - NSSBIRTALCACTIVEREFTXDET_GEN_A_CORE
Contacted [facility] [phone] and was informed to fax referral for review. Fax # [phone]

## 2023-04-18 NOTE — PROGRESS NOTE ADULT - PROBLEM SELECTOR PLAN 4
Last drink reportedly 4/8/23. Previously, drinking 2 - 6 shots of vodka per day. No prior episodes of withdrawal.  - symptom triggered CIWA  - SBIRT  - provide IV thiamine x 3 days  - start B1, B9, MVI
Last drink reportedly 4/8/23. Previously, drinking 2 - 6 shots of vodka per day. No prior episodes of withdrawal.  - symptom triggered CIWA  - SBIRT  - provide IV thiamine x 3 days  - start B1, B9, MVI

## 2023-04-18 NOTE — PROGRESS NOTE ADULT - PROBLEM SELECTOR PLAN 2
Chronic neuropathy involving left lateral distal arm, 5th finger, left lateral distal shin, 5th toe.   - Suspect ulnar and superficial peroneal distribution  - Most likely ddx includes work-related compression (), alcohol peripheral neuropathy  - Currently at baseline  - CTM Presenting with left sided 10/10 headache on 4/16 associated with acute on chronic peripheral neuropathy. No prior episodes.   - Code stroke called on 4/16  - CTH and CTA brain/neck negative  - resolved with toradol  - CTM  - Appreciate neurology recs

## 2023-04-18 NOTE — PROGRESS NOTE ADULT - ASSESSMENT
52M history of HTN, DM2 (metformin), alcohol use disorder, recent admission for alcoholic hepatitis ([facility] 4/11 - 4/14) presenting with left sided headache and acute on chronic left sided neuropathy admitted for reassessment of headache and further workup and management of suspected alcoholic hepatitis.

## 2023-04-18 NOTE — CHART NOTE - NSCHARTNOTEFT_GEN_A_CORE
Pt seen/examined at bedside.  S/p HD this AM, stable for DC.  Discussed follow-up plan with patient/daughter at bedside. Questions answered.  DC time 40 minutes Pt seen/examined at bedside.  Pt stable for DC, info given on follow-up plan for hepatology and imaging.  Pt verbalizes understanding of follow-up plan.  DC time 39 minutes

## 2023-04-18 NOTE — DISCHARGE NOTE NURSING/CASE MANAGEMENT/SOCIAL WORK - NSDCPEFALRISK_GEN_ALL_CORE
For information on Fall & Injury Prevention, visit: [email or web address] OR  [email or web address] OR  https://www.cdc.gov/steadi/patient.html

## 2023-04-18 NOTE — PROGRESS NOTE ADULT - REASON FOR ADMISSION
L-sided headache and numbness

## 2023-04-19 PROBLEM — K70.10 ALCOHOLIC HEPATITIS WITHOUT ASCITES: Chronic | Status: ACTIVE | Noted: 2023-04-16

## 2023-04-19 PROBLEM — E11.9 TYPE 2 DIABETES MELLITUS WITHOUT COMPLICATIONS: Chronic | Status: ACTIVE | Noted: 2023-04-16

## 2023-04-19 PROBLEM — F19.90 OTHER PSYCHOACTIVE SUBSTANCE USE, UNSPECIFIED, UNCOMPLICATED: Chronic | Status: ACTIVE | Noted: 2023-04-16

## 2023-04-19 LAB
ANA TITR SER: NEGATIVE — SIGNIFICANT CHANGE UP
LKM AB SER-ACNC: <20.1 UNITS — SIGNIFICANT CHANGE UP (ref 0–20)

## 2023-04-20 LAB
ALPHA-1-FETOPROTEIN-L3: 17.1 % — HIGH (ref 0–9.9)
ALPHA-1-FETOPROTEIN: 12 NG/ML — HIGH (ref 0–6.9)
PYRIDOXAL PHOS SERPL-MCNC: 5.7 UG/L — SIGNIFICANT CHANGE UP (ref 3.4–65.2)
VIT B1 SERPL-MCNC: 348.5 NMOL/L — HIGH (ref 66.5–200)

## 2023-04-25 PROBLEM — Z00.00 ENCOUNTER FOR PREVENTIVE HEALTH EXAMINATION: Status: ACTIVE | Noted: 2023-04-25

## 2023-04-26 ENCOUNTER — APPOINTMENT (OUTPATIENT)
Dept: HEPATOLOGY | Facility: CLINIC | Age: 53
End: 2023-04-26
Payer: SELF-PAY

## 2023-04-26 ENCOUNTER — NON-APPOINTMENT (OUTPATIENT)
Age: 53
End: 2023-04-26

## 2023-04-26 VITALS
BODY MASS INDEX: 26.96 KG/M2 | HEART RATE: 89 BPM | HEIGHT: 69 IN | SYSTOLIC BLOOD PRESSURE: 116 MMHG | WEIGHT: 182 LBS | TEMPERATURE: 97.9 F | OXYGEN SATURATION: 98 % | RESPIRATION RATE: 16 BRPM | DIASTOLIC BLOOD PRESSURE: 76 MMHG

## 2023-04-26 DIAGNOSIS — F10.11 ALCOHOL ABUSE, IN REMISSION: ICD-10-CM

## 2023-04-26 DIAGNOSIS — Z86.79 PERSONAL HISTORY OF OTHER DISEASES OF THE CIRCULATORY SYSTEM: ICD-10-CM

## 2023-04-26 DIAGNOSIS — Z86.39 PERSONAL HISTORY OF OTHER ENDOCRINE, NUTRITIONAL AND METABOLIC DISEASE: ICD-10-CM

## 2023-04-26 DIAGNOSIS — Z87.19 PERSONAL HISTORY OF OTHER DISEASES OF THE DIGESTIVE SYSTEM: ICD-10-CM

## 2023-04-26 LAB
BASOPHILS # BLD AUTO: 0.07 K/UL
BASOPHILS NFR BLD AUTO: 0.6 %
EOSINOPHIL # BLD AUTO: 0.01 K/UL
EOSINOPHIL NFR BLD AUTO: 0.1 %
HCT VFR BLD CALC: 37.4 %
HGB BLD-MCNC: 12 G/DL
IMM GRANULOCYTES NFR BLD AUTO: 1.5 %
LYMPHOCYTES # BLD AUTO: 1.43 K/UL
LYMPHOCYTES NFR BLD AUTO: 11.3 %
MAN DIFF?: NORMAL
MCHC RBC-ENTMCNC: 32.1 GM/DL
MCHC RBC-ENTMCNC: 32.7 PG
MCV RBC AUTO: 101.9 FL
MONOCYTES # BLD AUTO: 0.22 K/UL
MONOCYTES NFR BLD AUTO: 1.7 %
NEUTROPHILS # BLD AUTO: 10.72 K/UL
NEUTROPHILS NFR BLD AUTO: 84.8 %
PLATELET # BLD AUTO: 336 K/UL
RBC # BLD: 3.67 M/UL
RBC # FLD: 17.4 %
WBC # FLD AUTO: 12.64 K/UL

## 2023-04-26 PROCEDURE — 99204 OFFICE O/P NEW MOD 45 MIN: CPT

## 2023-04-26 RX ORDER — METFORMIN HYDROCHLORIDE 500 MG/1
500 TABLET, COATED ORAL
Refills: 0 | Status: ACTIVE | COMMUNITY

## 2023-04-26 RX ORDER — LISINOPRIL 20 MG/1
20 TABLET ORAL
Refills: 0 | Status: ACTIVE | COMMUNITY

## 2023-04-26 RX ORDER — AMLODIPINE BESYLATE 10 MG/1
10 TABLET ORAL
Refills: 0 | Status: ACTIVE | COMMUNITY

## 2023-04-26 NOTE — HISTORY OF PRESENT ILLNESS
[TextBox_42] : Transplant Hepatologist: Dr. Kathy Norris\[facility] Transplant Hepatology NP: Marlin Forman, [facility]\par \par Urban Rey is a 51 y/o male with a PMH of T2DM, HTN, AUD, and acute alcoholic hepatitis, here for follow-up after hospital discharge. \par \par Patient admitted at [facility] on 4/12/23 2/2 acute alcoholic hepatitis and started on Prednisolone 40 mg/day with positive response and subsequent improvement in Tbili (10 > 5.1). Recent admission at [facility] 2/2 severe headaches (w/u negative) and hepatic fxn continued to improve with TBili 3 at d/c. No abdominal imaging available for review. Pt admits to heavy ETOH use for ~ 20 yrs -- daily drinking with ~ 3 drinks/day. No tobacco or drugs. No DUI or legal issues. Previously worked as  and currently looking for employment. Lives at home with wife and children. \par  \par Patient's allergies, medications, past medical, surgical, family, and social histories were reviewed and updated as appropriate. Seen in clinic today, reports that he feels well and is w/o complaints. Reports compliance with steroids and denies any missed doses. Denies any recent fevers, chills, cough, lightheadedness, AMS, abdominal pain, n/v, diarrhea, hematochezia, hematemesis, and melena. Denies alcohol, tobacco, or recreational drug use. Has not joined ETOH rehab.

## 2023-04-26 NOTE — PLAN
[FreeTextEntry1] : \par #Acute Alcoholic Hepatitis\par - C/w Prednisolone 40 mg/day (started 4/12/23) with plan for taper pending continued improvement in hepatic fxn on updated labs\par - Obtain US Abd and plan for MRI Abd for cross sectional imaging\par - Extensively counseled on importance of ETOH cessation and risk for liver decompensation with relapse\par - Encouraged to join ETOH rehab and in agreement to join program\par - Counseled to maintain high protein and low Na diet\par - Reviewed importance of optimizing metabolic syndrome \par - Reinforced s/s of liver decompensation and advised to report immediately\par \par Update labs today. \par RTC in 4 weeks. \par Patient seen and plan discussed with Dr. Norris. \par \par Marlin Forman, MSN, [facility]\par Transplant Hepatology Nurse Practitioner\par [facility]\par [address] [place] [id number]\par T: [phone] | F: [phone]

## 2023-04-26 NOTE — ASSESSMENT
[FreeTextEntry1] : Urban Rey is a 53 y/o male with acute alcoholic hepatitis, positive steroid responder, here for follow-up after hospital discharge.

## 2023-04-26 NOTE — REVIEW OF SYSTEMS
[Fever] : no fever [Chills] : no chills [Fatigue] : no fatigue [Chest Pain] : no chest pain [Palpitations] : no palpitations [SOB] : no shortness of breath [Cough] : no cough [Abdominal Pain] : no abdominal pain [Nausea] : no nausea [Constipation] : no constipation [Diarrhea] : diarrhea [Vomiting] : no vomiting [Dysuria] : no dysuria [Hematuria] : no hematuria [Itching] : no itching

## 2023-04-27 ENCOUNTER — NON-APPOINTMENT (OUTPATIENT)
Age: 53
End: 2023-04-27

## 2023-04-27 LAB
ALBUMIN SERPL ELPH-MCNC: 4.2 G/DL
ALP BLD-CCNC: 140 U/L
ALT SERPL-CCNC: 59 U/L
ANION GAP SERPL CALC-SCNC: 14 MMOL/L
AST SERPL-CCNC: 30 U/L
BILIRUB SERPL-MCNC: 2.4 MG/DL
BUN SERPL-MCNC: 14 MG/DL
CALCIUM SERPL-MCNC: 9.9 MG/DL
CHLORIDE SERPL-SCNC: 98 MMOL/L
CO2 SERPL-SCNC: 25 MMOL/L
CREAT SERPL-MCNC: 0.64 MG/DL
EGFR: 115 ML/MIN/1.73M2
FERRITIN SERPL-MCNC: 2605 NG/ML
FOLATE SERPL-MCNC: 14.5 NG/ML
GLUCOSE SERPL-MCNC: 212 MG/DL
INR PPP: 1.2 RATIO
IRON SATN MFR SERPL: 24 %
IRON SERPL-MCNC: 96 UG/DL
POTASSIUM SERPL-SCNC: 4.5 MMOL/L
PROT SERPL-MCNC: 7.2 G/DL
PT BLD: 14.2 SEC
SODIUM SERPL-SCNC: 136 MMOL/L
TIBC SERPL-MCNC: 397 UG/DL
UIBC SERPL-MCNC: 301 UG/DL
VIT B12 SERPL-MCNC: 586 PG/ML

## 2023-05-04 ENCOUNTER — NON-APPOINTMENT (OUTPATIENT)
Age: 53
End: 2023-05-04

## 2023-05-04 LAB
BASOPHILS # BLD AUTO: 0.08 K/UL
BASOPHILS NFR BLD AUTO: 0.9 %
EOSINOPHIL # BLD AUTO: 0.34 K/UL
EOSINOPHIL NFR BLD AUTO: 3.8 %
HCT VFR BLD CALC: 42.2 %
HGB BLD-MCNC: 13.7 G/DL
IMM GRANULOCYTES NFR BLD AUTO: 0.4 %
INR PPP: 1.19 RATIO
LYMPHOCYTES # BLD AUTO: 2.46 K/UL
LYMPHOCYTES NFR BLD AUTO: 27.6 %
MAN DIFF?: NORMAL
MCHC RBC-ENTMCNC: 32.5 GM/DL
MCHC RBC-ENTMCNC: 32.8 PG
MCV RBC AUTO: 101 FL
MONOCYTES # BLD AUTO: 0.62 K/UL
MONOCYTES NFR BLD AUTO: 7 %
NEUTROPHILS # BLD AUTO: 5.38 K/UL
NEUTROPHILS NFR BLD AUTO: 60.3 %
PLATELET # BLD AUTO: 253 K/UL
PT BLD: 14 SEC
RBC # BLD: 4.18 M/UL
RBC # FLD: 14.7 %
WBC # FLD AUTO: 8.92 K/UL

## 2023-05-05 DIAGNOSIS — K70.10 ALCOHOLIC HEPATITIS W/OUT ASCITES: ICD-10-CM

## 2023-05-05 LAB
ALBUMIN SERPL ELPH-MCNC: 4.4 G/DL
ALP BLD-CCNC: 107 U/L
ALT SERPL-CCNC: 31 U/L
ANION GAP SERPL CALC-SCNC: 13 MMOL/L
AST SERPL-CCNC: 26 U/L
BILIRUB DIRECT SERPL-MCNC: 0.8 MG/DL
BILIRUB SERPL-MCNC: 1.6 MG/DL
BUN SERPL-MCNC: 8 MG/DL
CALCIUM SERPL-MCNC: 10.6 MG/DL
CHLORIDE SERPL-SCNC: 97 MMOL/L
CO2 SERPL-SCNC: 26 MMOL/L
CREAT SERPL-MCNC: 0.8 MG/DL
EGFR: 108 ML/MIN/1.73M2
GLUCOSE SERPL-MCNC: 140 MG/DL
POTASSIUM SERPL-SCNC: 4.3 MMOL/L
PROT SERPL-MCNC: 7.5 G/DL
SODIUM SERPL-SCNC: 136 MMOL/L

## 2023-05-05 RX ORDER — PREDNISOLONE ORAL 15 MG/5ML
15 SOLUTION ORAL
Refills: 0 | Status: DISCONTINUED | COMMUNITY
End: 2023-05-05

## 2023-05-05 RX ORDER — PREDNISONE 5 MG/1
5 TABLET ORAL
Qty: 7 | Refills: 0 | Status: ACTIVE | COMMUNITY
Start: 2023-05-05 | End: 1900-01-01

## 2023-05-10 ENCOUNTER — EMERGENCY (EMERGENCY)
Facility: HOSPITAL | Age: 53
LOS: 1 days | Discharge: HOME | End: 2023-05-10
Attending: EMERGENCY MEDICINE | Admitting: EMERGENCY MEDICINE
Payer: COMMERCIAL

## 2023-05-10 VITALS
TEMPERATURE: 98 F | OXYGEN SATURATION: 100 % | DIASTOLIC BLOOD PRESSURE: 80 MMHG | SYSTOLIC BLOOD PRESSURE: 124 MMHG | HEART RATE: 100 BPM | HEIGHT: 69 IN | RESPIRATION RATE: 16 BRPM

## 2023-05-10 PROCEDURE — 99284 EMERGENCY DEPT VISIT MOD MDM: CPT

## 2023-05-10 PROCEDURE — 73610 X-RAY EXAM OF ANKLE: CPT | Mod: 26,RT

## 2023-05-10 PROCEDURE — 73630 X-RAY EXAM OF FOOT: CPT | Mod: 26,RT

## 2023-05-10 RX ORDER — KETOROLAC TROMETHAMINE 30 MG/ML
30 SYRINGE (ML) INJECTION ONCE
Refills: 0 | Status: DISCONTINUED | OUTPATIENT
Start: 2023-05-10 | End: 2023-05-10

## 2023-05-10 RX ADMIN — Medication 30 MILLIGRAM(S): at 08:30

## 2023-05-10 NOTE — ED ADULT NURSE NOTE - OBJECTIVE STATEMENT
Received pt to bed 18, A+Ox4, ambulatory w/ cane. C/O right foot/ankle pain, pt denies injury. pulses and sensations equal bilaterally. no other medical complains at this time. Pt denies any chest pain, SOB, headache, dizziness, N/V/D, fever, chills. Medicated as per MD, will continue to monitor.

## 2023-05-10 NOTE — ED ADULT TRIAGE NOTE - HEIGHT IN CM
175.26 FIM gait=5. Spoke to RN Dipti, pt cleared for PT. PT order in paper chart. Admitted JAIME, s/p cath. Pt rcvd semi supine, +tele, +heplock. Pt agreeable to PT, A&Ox4, denies pain with R groin soft. Tolerated session fairly well, demo CG bed mob, supervision transfers, supervision amb 75ft x2, 1 flight supervision.

## 2023-05-10 NOTE — ED PROVIDER NOTE - PATIENT PORTAL LINK FT
You can access the FollowMyHealth Patient Portal offered by [facility] by registering at the following website: [email or web address]. By joining [facility]’s FollowMyHealth portal, you will also be able to view your health information using other applications (apps) compatible with our system.

## 2023-05-10 NOTE — ED PROVIDER NOTE - PHYSICAL EXAMINATION
Gen: Well appearing in NAD  Head: NC/AT  Neck: trachea midline  Resp:  No distress  Ext: no deformities  Neuro:  A&O appears non focal  Skin:  Warm and dry as visualized  Psych:  Normal affect and mood    RLE: no swelling or obv def. full rom with pain. +ttp lateral malelous and lower achilles. + pain with passive dorsiflexion and plantar flexion. sensations intact. no redness induration or fluctuance. sensations intact.

## 2023-05-10 NOTE — ED PROVIDER NOTE - OBJECTIVE STATEMENT
51 y/o male hx HTN DM2 alcoholic hepatitis presents to ER c/o right foot pain. Pt. sates 4 days ago developed pain to to dorsum of his right foot - atraumatic - states thinks he was walking a lot on it which may have contributed. Pt. states he rested and pain got better but then over past 2 days pain has gotten worse and now had pain to inferior ankle on lateral aspect and describes burning pain to area. Pt. denies fall or trauma to area. Denies taking any medications for the pain. States able to ambulate but with cane. Pt. denies fever chills redness swelling numbness tingling.   Pt. w/ recent hospitalization for alcoholic hepatitis 2 weeks ago.

## 2023-05-10 NOTE — ED PROVIDER NOTE - CLINICAL SUMMARY MEDICAL DECISION MAKING FREE TEXT BOX
51 y/o male htn DM2 c/o atraumatic right foot/ankle pain x 4 days  -possible overuse/tendinopathy vs neuropathy, r/o fracture, low suspicion for gout or infection  -xrays nsaids  -outpt podiatry follow up

## 2023-05-10 NOTE — ED PROVIDER NOTE - ATTENDING APP SHARED VISIT CONTRIBUTION OF CARE
Dr. Jeter:  I performed a face to face bedside interview with patient regarding history of present illness, review of symptoms and past medical history. I completed an independent physical exam.  I have discussed patient's plan of care with PA.   I agree with note as stated above, having amended the EMR as needed to reflect my findings.   This includes HISTORY OF PRESENT ILLNESS, HIV, PAST MEDICAL/SURGICAL/FAMILY/SOCIAL HISTORY, ALLERGIES AND HOME MEDICATIONS, REVIEW OF SYSTEMS, PHYSICAL EXAM, and any PROGRESS NOTES during the time I functioned as the attending physician for this patient.    52M h/o HTN, DM, alcoholic hepatitis, presents with atraumatic right foot pain x 4 days.  Pain mostly by interior/lateral ankle, feels like a burning sensation, able to ambulate with pain.  ROS otherwise negative.    Exam:  - nad  - rrr  - ctab  - +mild TTP lateral malleolus & by the achilles; neurovascularly intact    A/P  - foot pain, eval occult fracture  - XR foot & ankle  - pain meds

## 2023-05-10 NOTE — ED ADULT TRIAGE NOTE - CHIEF COMPLAINT QUOTE
Pt c/o atraumatic right foot pain X 6 days. Denies falls or trauma to foot. Appears comfortable. Pmhx: DM.

## 2023-05-10 NOTE — ED PROVIDER NOTE - NS ED ATTENDING STATEMENT MOD
This was a shared visit with the PIPPA. I reviewed and verified the documentation and independently performed the documented:

## 2023-05-12 LAB — PHOSPHATIDYETHANOL (PETH), WHOLE BLOOD: POSITIVE

## 2023-05-24 ENCOUNTER — APPOINTMENT (OUTPATIENT)
Dept: HEPATOLOGY | Facility: CLINIC | Age: 53
End: 2023-05-24

## 2023-06-21 ENCOUNTER — APPOINTMENT (OUTPATIENT)
Dept: HEPATOLOGY | Facility: CLINIC | Age: 53
End: 2023-06-21

## 2024-04-14 ENCOUNTER — NON-APPOINTMENT (OUTPATIENT)
Age: 54
End: 2024-04-14

## 2025-04-14 NOTE — DISCHARGE NOTE PROVIDER - NSRESEARCHGRANT_MLMHIDDEN_GEN_A_CORE
Pt left vm stating she would like to schedule a new pt appt. Call was returned and pt was told a referral would have to be sent from her PCP, an er or urgent care. Pt stated she understood and would work on it and get her PCP to send one over. Fax number was provided.    yes